# Patient Record
Sex: MALE | Race: WHITE | HISPANIC OR LATINO | Employment: FULL TIME | URBAN - METROPOLITAN AREA
[De-identification: names, ages, dates, MRNs, and addresses within clinical notes are randomized per-mention and may not be internally consistent; named-entity substitution may affect disease eponyms.]

---

## 2017-02-20 ENCOUNTER — APPOINTMENT (OUTPATIENT)
Dept: RADIOLOGY | Facility: MEDICAL CENTER | Age: 47
End: 2017-02-20
Attending: EMERGENCY MEDICINE
Payer: MEDICAID

## 2017-02-20 ENCOUNTER — HOSPITAL ENCOUNTER (EMERGENCY)
Facility: MEDICAL CENTER | Age: 47
End: 2017-02-20
Attending: EMERGENCY MEDICINE
Payer: MEDICAID

## 2017-02-20 VITALS
BODY MASS INDEX: 35.4 KG/M2 | TEMPERATURE: 97 F | HEIGHT: 67 IN | RESPIRATION RATE: 16 BRPM | WEIGHT: 225.53 LBS | OXYGEN SATURATION: 99 % | DIASTOLIC BLOOD PRESSURE: 56 MMHG | HEART RATE: 88 BPM | SYSTOLIC BLOOD PRESSURE: 109 MMHG

## 2017-02-20 DIAGNOSIS — R07.9 LEFT SIDED CHEST PAIN: ICD-10-CM

## 2017-02-20 LAB
ALBUMIN SERPL BCP-MCNC: 4.5 G/DL (ref 3.2–4.9)
ALBUMIN/GLOB SERPL: 1.5 G/DL
ALP SERPL-CCNC: 77 U/L (ref 30–99)
ALT SERPL-CCNC: 26 U/L (ref 2–50)
ANION GAP SERPL CALC-SCNC: 7 MMOL/L (ref 0–11.9)
AST SERPL-CCNC: 23 U/L (ref 12–45)
BASOPHILS # BLD AUTO: 0.1 % (ref 0–1.8)
BASOPHILS # BLD: 0.01 K/UL (ref 0–0.12)
BILIRUB SERPL-MCNC: 0.4 MG/DL (ref 0.1–1.5)
BUN SERPL-MCNC: 18 MG/DL (ref 8–22)
CALCIUM SERPL-MCNC: 9.3 MG/DL (ref 8.4–10.2)
CHLORIDE SERPL-SCNC: 101 MMOL/L (ref 96–112)
CO2 SERPL-SCNC: 28 MMOL/L (ref 20–33)
CREAT SERPL-MCNC: 0.95 MG/DL (ref 0.5–1.4)
EOSINOPHIL # BLD AUTO: 0.04 K/UL (ref 0–0.51)
EOSINOPHIL NFR BLD: 0.6 % (ref 0–6.9)
ERYTHROCYTE [DISTWIDTH] IN BLOOD BY AUTOMATED COUNT: 42.3 FL (ref 35.9–50)
GFR SERPL CREATININE-BSD FRML MDRD: >60 ML/MIN/1.73 M 2
GLOBULIN SER CALC-MCNC: 3 G/DL (ref 1.9–3.5)
GLUCOSE SERPL-MCNC: 97 MG/DL (ref 65–99)
HCT VFR BLD AUTO: 42 % (ref 42–52)
HGB BLD-MCNC: 14.7 G/DL (ref 14–18)
IMM GRANULOCYTES # BLD AUTO: 0.01 K/UL (ref 0–0.11)
IMM GRANULOCYTES NFR BLD AUTO: 0.1 % (ref 0–0.9)
LIPASE SERPL-CCNC: 28 U/L (ref 7–58)
LYMPHOCYTES # BLD AUTO: 3.69 K/UL (ref 1–4.8)
LYMPHOCYTES NFR BLD: 51 % (ref 22–41)
MCH RBC QN AUTO: 29.8 PG (ref 27–33)
MCHC RBC AUTO-ENTMCNC: 35 G/DL (ref 33.7–35.3)
MCV RBC AUTO: 85 FL (ref 81.4–97.8)
MONOCYTES # BLD AUTO: 0.5 K/UL (ref 0–0.85)
MONOCYTES NFR BLD AUTO: 6.9 % (ref 0–13.4)
NEUTROPHILS # BLD AUTO: 2.99 K/UL (ref 1.82–7.42)
NEUTROPHILS NFR BLD: 41.3 % (ref 44–72)
NRBC # BLD AUTO: 0 K/UL
NRBC BLD AUTO-RTO: 0 /100 WBC
PLATELET # BLD AUTO: 253 K/UL (ref 164–446)
PMV BLD AUTO: 9.4 FL (ref 9–12.9)
POTASSIUM SERPL-SCNC: 4 MMOL/L (ref 3.6–5.5)
PROT SERPL-MCNC: 7.5 G/DL (ref 6–8.2)
RBC # BLD AUTO: 4.94 M/UL (ref 4.7–6.1)
SODIUM SERPL-SCNC: 136 MMOL/L (ref 135–145)
TROPONIN I SERPL-MCNC: <0.02 NG/ML (ref 0–0.04)
WBC # BLD AUTO: 7.2 K/UL (ref 4.8–10.8)

## 2017-02-20 PROCEDURE — 93005 ELECTROCARDIOGRAM TRACING: CPT

## 2017-02-20 PROCEDURE — 84484 ASSAY OF TROPONIN QUANT: CPT

## 2017-02-20 PROCEDURE — 83690 ASSAY OF LIPASE: CPT

## 2017-02-20 PROCEDURE — 85025 COMPLETE CBC W/AUTO DIFF WBC: CPT

## 2017-02-20 PROCEDURE — 36415 COLL VENOUS BLD VENIPUNCTURE: CPT

## 2017-02-20 PROCEDURE — 99284 EMERGENCY DEPT VISIT MOD MDM: CPT

## 2017-02-20 PROCEDURE — 700111 HCHG RX REV CODE 636 W/ 250 OVERRIDE (IP): Performed by: EMERGENCY MEDICINE

## 2017-02-20 PROCEDURE — 96374 THER/PROPH/DIAG INJ IV PUSH: CPT

## 2017-02-20 PROCEDURE — 700105 HCHG RX REV CODE 258: Performed by: EMERGENCY MEDICINE

## 2017-02-20 PROCEDURE — 71010 DX-CHEST-PORTABLE (1 VIEW): CPT

## 2017-02-20 PROCEDURE — A9270 NON-COVERED ITEM OR SERVICE: HCPCS | Performed by: EMERGENCY MEDICINE

## 2017-02-20 PROCEDURE — 96361 HYDRATE IV INFUSION ADD-ON: CPT

## 2017-02-20 PROCEDURE — 80053 COMPREHEN METABOLIC PANEL: CPT

## 2017-02-20 PROCEDURE — 700102 HCHG RX REV CODE 250 W/ 637 OVERRIDE(OP): Performed by: EMERGENCY MEDICINE

## 2017-02-20 RX ORDER — KETOROLAC TROMETHAMINE 30 MG/ML
30 INJECTION, SOLUTION INTRAMUSCULAR; INTRAVENOUS ONCE
Status: COMPLETED | OUTPATIENT
Start: 2017-02-20 | End: 2017-02-20

## 2017-02-20 RX ORDER — ASPIRIN 81 MG/1
324 TABLET, CHEWABLE ORAL ONCE
Status: COMPLETED | OUTPATIENT
Start: 2017-02-20 | End: 2017-02-20

## 2017-02-20 RX ORDER — SODIUM CHLORIDE 9 MG/ML
1000 INJECTION, SOLUTION INTRAVENOUS ONCE
Status: COMPLETED | OUTPATIENT
Start: 2017-02-20 | End: 2017-02-20

## 2017-02-20 RX ADMIN — SODIUM CHLORIDE 1000 ML: 9 INJECTION, SOLUTION INTRAVENOUS at 18:22

## 2017-02-20 RX ADMIN — KETOROLAC TROMETHAMINE 30 MG: 30 INJECTION, SOLUTION INTRAMUSCULAR; INTRAVENOUS at 18:21

## 2017-02-20 RX ADMIN — ASPIRIN 81 MG CHEWABLE TABLET 324 MG: 81 TABLET CHEWABLE at 18:22

## 2017-02-20 ASSESSMENT — PAIN SCALES - GENERAL: PAINLEVEL_OUTOF10: 7

## 2017-02-20 NOTE — ED AVS SNAPSHOT
2/20/2017          Yaneli Samayoa  [address] [facility] [address]    Dear Yaneli:    [facility] wants to ensure your discharge home is safe and you or your loved ones have had all your questions answered regarding your care after you leave the hospital.    You may receive a telephone call within two days of your discharge.  This call is to make certain you understand your discharge instructions as well as ensure we provided you with the best care possible during your stay with us.     The call will only last approximately 3-5 minutes and will be done by a nurse.    Once again, we want to ensure your discharge home is safe and that you have a clear understanding of any next steps in your care.  If you have any questions or concerns, please do not hesitate to contact us, we are here for you.  Thank you for choosing [facility] for your healthcare needs.    Sincerely,    Graham Fritz    [facility]

## 2017-02-20 NOTE — ED AVS SNAPSHOT
Home Care Instructions                                                                                                                Yaneli Samayoa   MRN: [id number]    Department:  [facility], Emergency Dept   Date of Visit:  2/20/2017            [facility], Emergency Dept    [address]    [place] [address]    Phone:  [phone]      You were seen by     Carlos Alberto Bauman M.D.      Your Diagnosis Was     Left sided chest pain     R07.9       These are the medications you received during your hospitalization from 02/20/2017 1753 to 02/20/2017 1848     Date/Time Order Dose Route Action    02/20/2017 1822 NS infusion 1,000 mL 1,000 mL Intravenous New Bag    02/20/2017 1822 aspirin (ASA) chewable tab 324 mg 324 mg Oral Given    02/20/2017 1821 ketorolac (TORADOL) injection 30 mg 30 mg Intravenous Given      Follow-up Information     1. Follow up with [facility].    Contact information    [address] [address] [phone]        2. Follow up with [facility], Emergency Dept.    Specialty:  Emergency Medicine    Why:  If symptoms worsen, As needed    Contact information    [address] [facility] [address] [phone]      Medication Information     Review all of your home medications and newly ordered medications with your primary doctor and/or pharmacist as soon as possible. Follow medication instructions as directed by your doctor and/or pharmacist.     Please keep your complete medication list with you and share with your physician. Update the information when medications are discontinued, doses are changed, or new medications (including over-the-counter products) are added; and carry medication information at all times in the event of emergency situations.               Medication List      Notice     You have not been prescribed any medications.            Procedures and tests  performed during your visit     CBC WITH DIFFERENTIAL    COMP METABOLIC PANEL    DX-CHEST-PORTABLE (1 VIEW)    ESTIMATED GFR    LIPASE    TROPONIN        Discharge Instructions       Return immediately to the Emergency Department if you experience continuing or worsening discomfort in your chest, any difficulty breathing, back pain, abdominal pain, or any other new or worsening symptoms.      Dolor en el pecho, Inespecífico  (Chest Pain, Nonspecific)  Con frecuencia es difícil farhad un diagnóstico específico de la causa del dolor de pecho. Siempre hay lisa posibilidad de que latif dolor puede estar relacionado con algo grave, river un ataque al corazón o un coágulo de nicole en los pulmones. Deberá hacer controles con latif médico para lisa evaluación adicional. Puede ser necesario realizar otros análisis de laboratorio u otros estudios tales river radiografías, electrocardiograma, prueba de esfuerzo, o diagnóstico por imágenes para el corazón para determinar la causa de latif dolor.   La mayor parte del dolor en el pecho inespecífico mejorará en 2 ó 3 días de reposo y analgésicos suaves. Bernabe los próximos días evite los ejercicios físicos o las actividades que le causan dolor. No fume. Evite consumir alcohol. Comuníquese con latif médico para realizar controles según las indicaciones.   SOLICITE ATENCIÓN MÉDICA DE INMEDIATO SI:  · El dolor en el pecho aumenta o se irradia hacia el brazo, el keisha, la mandíbula, la espalda o el abdomen.   · Le falta el aire, aumenta la tos o comienza a escupir nicole al toser.   · Siente un dolor intenso en la espalda, el abdomen, tiene náuseas o vómitos intensos.   · Desarrolla lisa debilidad extrema, se desmaya, tiene fiebre o escalofríos.   Document Released: 12/18/2006 Document Revised: 03/11/2013  ExitCare® Patient Information ©2013 [employer].          Patient Information     Patient Information    Following emergency treatment: all patient requiring follow-up care must return either to a  private physician or a clinic if your condition worsens before you are able to obtain further medical attention, please return to the emergency room.     Billing Information    At [facility], we work to make the billing process streamlined for our patients.  Our Representatives are here to answer any questions you may have regarding your hospital bill.  If you have insurance coverage and have supplied your insurance information to us, we will submit a claim to your insurer on your behalf.  Should you have any questions regarding your bill, we can be reached online or by phone as follows:  Online: You are able pay your bills online or live chat with our representatives about any billing questions you may have. We are here to help Monday - Friday from 8:00am to 7:30pm and 9:00am - 12:00pm on Saturdays.  Please visit [email or web address]  for more information.   Phone:  [phone] or [phone]    Please note that your emergency physician, surgeon, pathologist, radiologist, anesthesiologist, and other specialists are not employed by [facility] and will therefore bill separately for their services.  Please contact them directly for any questions concerning their bills at the numbers below:     Emergency Physician Services:  [phone]  [facility]:  [phone]  [facility]:  [phone]  [facility]:  [phone]    1. Your final bill may vary from the amount quoted upon discharge if all procedures are not complete at that time, or if your doctor has additional procedures of which we are not aware. You will receive an additional bill if you return to the Emergency Department at [facility] for suture removal regardless of the facility of which the sutures were placed.     2. Please arrange for settlement of this account at the emergency registration.    3. All self-pay accounts are due in full at the time of treatment.  If you  are unable to meet this obligation then payment is expected within 4-5 days.     4. If you have had radiology studies (CT, X-ray, Ultrasound, MRI), you have received a preliminary result during your emergency department visit. Please contact the radiology department [phone] to receive a copy of your final result. Please discuss the Final result with your primary physician or with the follow up physician provided.     Crisis Hotline:  [place] Crisis Hotline:  6-146-LJAKOVR or 1-757.256.2235  [place] Crisis Hotline:    1-542.141.7568 or 230-803-3210         ED Discharge Follow Up Questions    1. In order to provide you with very good care, we would like to follow up with a phone call in the next few days.  May we have your permission to contact you?     YES /  NO    2. What is the best phone number to call you? (       )_____-__________    3. What is the best time to call you?      Morning  /  Afternoon  /  Evening                   Patient Signature:  ____________________________________________________________    Date:  ____________________________________________________________

## 2017-02-20 NOTE — ED AVS SNAPSHOT
sourceasy Access Code: [email or web address]  Expires: 3/22/2017  6:47 PM    Your email address is not on file at [facility].  Email Addresses are required for you to sign up for sourceasy, please contact [phone] to verify your personal information and to provide your email address prior to attempting to register for sourceasy.    Yaneli Samayoa  [address] [place] [address]    sourceasy  A secure, online tool to manage your health information     [facility]’s sourceasy® is a secure, online tool that connects you to your personalized health information from the privacy of your home -- day or night - making it very easy for you to manage your healthcare. Once the activation process is completed, you can even access your medical information using the sourceasy jules, which is available for free in the Apple Jules store or Google Play store.     To learn more about sourceasy, visit [email or web address]    There are two levels of access available (as shown below):   My Chart Features  [facility] Primary Care Doctor [facility]  Specialists [facility]  Urgent  Care Non-[facility] Primary Care Doctor   Email your healthcare team securely and privately 24/7 X X X    Manage appointments: schedule your next appointment; view details of past/upcoming appointments X      Request prescription refills. X      View recent personal medical records, including lab and immunizations X X X X   View health record, including health history, allergies, medications X X X X   Read reports about your outpatient visits, procedures, consult and ER notes X X X X   See your discharge summary, which is a recap of your hospital and/or ER visit that includes your diagnosis, lab results, and care plan X X  X     How to register for Typekitt:  Once your e-mail address has been verified, follow the following steps to sign up for Typekitt.     1. Go to  [email or web address]  2. Click on the Sign Up Now box, which takes you to the New Member Sign Up  page. You will need to provide the following information:  a. Enter your [email or web address] Access Code exactly as it appears at the top of this page. (You will not need to use this code after you’ve completed the sign-up process. If you do not sign up before the expiration date, you must request a new code.)   b. Enter your date of birth.   c. Enter your home email address.   d. Click Submit, and follow the next screen’s instructions.  3. Create a [email or web address] ID. This will be your [email or web address] login ID and cannot be changed, so think of one that is secure and easy to remember.  4. Create a ddmap.com password. You can change your password at any time.  5. Enter your Password Reset Question and Answer. This can be used at a later time if you forget your password.   6. Enter your e-mail address. This allows you to receive e-mail notifications when new information is available in [email or web address].  7. Click Sign Up. You can now view your health information.    For assistance activating your [email or web address] account, call [phone]

## 2017-02-21 NOTE — ED PROVIDER NOTES
"ED Provider Note    CHIEF COMPLAINT  Chief Complaint   Patient presents with   • Chest Pain     Left sided chest pain. Pt states \"it may be air stuck in there, its painful to take deep breaths\"       INOCENCIA Samayoa is a 46 y.o. male who presents for evaluation of a dull left-sided chest pain that is been present constantly for the past 8 hours without resolution. Worsened with deep inspiration, coughing as well as movement of his arm. He also reports is worse if he touches his chest. He denies radiation of the pain, no worsening with exertion, no associated shortness of breath, diaphoresis or nausea. Patient has no known coronary disease, he does not have a history of hypertension or hypercholesterolemia or diabetes and he has no significant family history of coronary disease that he knows of. He is not a smoker. No back pain, no abdominal pain, no weakness, numbness or tingling he has no other complaints at this time. No unilateral leg pain or swelling, no recent travel or immobilization, no hemoptysis, no known cancer    REVIEW OF SYSTEMS  Negative for fever, rash, dyspnea, abdominal pain, nausea, vomiting, diarrhea, headache, focal weakness, focal numbness, focal tingling, back pain. All other systems are negative.     PAST MEDICAL HISTORY  No past medical history on file.    FAMILY HISTORY  No family history on file.    SOCIAL HISTORY  Social History   Substance Use Topics   • Smoking status: Not on file   • Smokeless tobacco: Not on file   • Alcohol Use: Not on file       SURGICAL HISTORY  No past surgical history on file.    CURRENT MEDICATIONS  I personally reviewed the medication list in the charting documentation.     ALLERGIES  Allergies not on file    MEDICAL RECORD  I have reviewed patient's medical record and pertinent results are listed above.      PHYSICAL EXAM  VITAL SIGNS: /56 mmHg  Pulse 88  Temp(Src) 36.1 °C (97 °F)  Resp 16  Ht 1.702 m (5' 7.01\")  Wt 102.3 kg (225 lb 8.5 " oz)  BMI 35.31 kg/m2  SpO2 99%   Constitutional: Well appearing patient in no acute distress.  Not toxic, nor ill in appearance.  HENT: Mucus membranes moist.    Eyes: No scleral icterus. Normal conjunctiva   Neck: Supple, comfortable, nonpainful range of motion.   Cardiovascular: Regular heart rate and rhythm.   Thorax & Lungs: Chest is nontender.  Lungs are clear to auscultation with good air movement bilaterally.  No wheeze, rhonchi, nor rales.   Abdomen: Soft, with no tenderness, rebound nor guarding.  No mass or pulsatile mass appreciated.  Skin: Warm, dry. No rash appreciated  Extremities/Musculoskeletal: No sign of trauma. No asymmetric calf tenderness, erythema or edema. Normal range of motion   Neurologic: Alert & oriented. No focal deficits observed.   Psychiatric: Normal affect appropriate for the clinical situation.    DIAGNOSTIC STUDIES / PROCEDURES    EKG  12 Lead EKG interpreted by me to show:    Rate 62  Rhythm: Normal sinus rhythm  Axis: Normal  KS and QRS Intervals: Normal  T waves: No acute changes  ST segments: No acute changes  Ectopy: None.    My impression of this EKG: Does not indicate ischemia or arrythmia at this time.    LABS  Results for orders placed or performed during the hospital encounter of 02/20/17   CBC WITH DIFFERENTIAL   Result Value Ref Range    WBC 7.2 4.8 - 10.8 K/uL    RBC 4.94 4.70 - 6.10 M/uL    Hemoglobin 14.7 14.0 - 18.0 g/dL    Hematocrit 42.0 42.0 - 52.0 %    MCV 85.0 81.4 - 97.8 fL    MCH 29.8 27.0 - 33.0 pg    MCHC 35.0 33.7 - 35.3 g/dL    RDW 42.3 35.9 - 50.0 fL    Platelet Count 253 164 - 446 K/uL    MPV 9.4 9.0 - 12.9 fL    Neutrophils-Polys 41.30 (L) 44.00 - 72.00 %    Lymphocytes 51.00 (H) 22.00 - 41.00 %    Monocytes 6.90 0.00 - 13.40 %    Eosinophils 0.60 0.00 - 6.90 %    Basophils 0.10 0.00 - 1.80 %    Immature Granulocytes 0.10 0.00 - 0.90 %    Nucleated RBC 0.00 /100 WBC    Neutrophils (Absolute) 2.99 1.82 - 7.42 K/uL    Lymphs (Absolute) 3.69 1.00 - 4.80  K/uL    Monos (Absolute) 0.50 0.00 - 0.85 K/uL    Eos (Absolute) 0.04 0.00 - 0.51 K/uL    Baso (Absolute) 0.01 0.00 - 0.12 K/uL    Immature Granulocytes (abs) 0.01 0.00 - 0.11 K/uL    NRBC (Absolute) 0.00 K/uL   COMP METABOLIC PANEL   Result Value Ref Range    Sodium 136 135 - 145 mmol/L    Potassium 4.0 3.6 - 5.5 mmol/L    Chloride 101 96 - 112 mmol/L    Co2 28 20 - 33 mmol/L    Anion Gap 7.0 0.0 - 11.9    Glucose 97 65 - 99 mg/dL    Bun 18 8 - 22 mg/dL    Creatinine 0.95 0.50 - 1.40 mg/dL    Calcium 9.3 8.4 - 10.2 mg/dL    AST(SGOT) 23 12 - 45 U/L    ALT(SGPT) 26 2 - 50 U/L    Alkaline Phosphatase 77 30 - 99 U/L    Total Bilirubin 0.4 0.1 - 1.5 mg/dL    Albumin 4.5 3.2 - 4.9 g/dL    Total Protein 7.5 6.0 - 8.2 g/dL    Globulin 3.0 1.9 - 3.5 g/dL    A-G Ratio 1.5 g/dL   TROPONIN   Result Value Ref Range    Troponin I <0.02 0.00 - 0.04 ng/mL   LIPASE   Result Value Ref Range    Lipase 28 7 - 58 U/L   ESTIMATED GFR   Result Value Ref Range    GFR If African American >60 >60 mL/min/1.73 m 2    GFR If Non African American >60 >60 mL/min/1.73 m 2         RADIOLOGY  DX-CHEST-PORTABLE (1 VIEW)   Final Result      1.  There is no acute cardiopulmonary process.            COURSE & MEDICAL DECISION MAKING  I have reviewed any medical record information, laboratory studies and radiographic results as noted above.  Differential diagnoses includes: ACS, anemia, dehydration, pneumothorax, pneumonia, chest wall pain, doubt pulmonary embolism    Encounter Summary: This is a 46 y.o. male with left-sided chest pain, so he present for 8 hours, nonexertional, no shortness of breath, he no identifiable risk factors for coronary disease. His vital signs are within normal limits, his EKG is normal. The patient's exam is normal.The patient was ruled out for PE by PERC criteria: AGE < 50; No estrogens, BCPs, recent surgery (4 weeks); No hx of: DVT/PE or hemoptysis; No unilateral leg swelling; Pulse Ox > 94% and HR <100. Will obtain a  troponin which will effectively rule out myocardial ischemia given the duration of constant symptoms, will check a chest x-ray and some basic blood work as well. Will medicate with Toradol and reevaluate ----- blood work is unremarkable, x-ray is unremarkable. Patient is feeling better, he is now pain free after Toradol. At this point, given the duration of constant symptoms I think we have reasonably ruled out myocardial ischemia and infarction. I think it is a musculoskeletal etiology. Discussed symptomatically treatment with anti-inflammatory. Discussed strict return instructions to both he and his significant other at the bedside expressed understanding.        DISPOSITION: Discharged home in stable condition      FINAL IMPRESSION  1. Left sided chest pain           This dictation was created using voice recognition software. The accuracy of the dictation is limited to the abilities of the software. I expect there may be some errors of grammar and possibly content. The nursing notes were reviewed and certain aspects of this information were incorporated into this note.    Electronically signed by: Carlos Alberto Bauman, 2/20/2017 6:12 PM

## 2017-02-21 NOTE — ED NOTES
"Chief Complaint   Patient presents with   • Chest Pain     Left sided chest pain. Pt states \"it may be air stuck in there, its painful to take deep breaths\"       /56 mmHg  Pulse 88  Temp(Src) 36.1 °C (97 °F)  Resp 16  Ht 1.702 m (5' 7.01\")  Wt 102.3 kg (225 lb 8.5 oz)  BMI 35.31 kg/m2  SpO2 99%    "

## 2017-02-21 NOTE — ED NOTES
1830 report received from primary rn Jac, care assumed at this time  1840 All results back, chart up for MD for re evaluation. poc update given to pt. No further questions at this time. Further orders and dispo pending  1845 pt re evaluated by md  1856 D/c pt home, with family . Pt aware of f/u instructions , aware to return for any changes or concerns. No further questions upon d/c home from ed

## 2017-02-21 NOTE — DISCHARGE INSTRUCTIONS
Return immediately to the Emergency Department if you experience continuing or worsening discomfort in your chest, any difficulty breathing, back pain, abdominal pain, or any other new or worsening symptoms.      Dolor en el pecho, Inespecífico  (Chest Pain, Nonspecific)  Con frecuencia es difícil farhad un diagnóstico específico de la causa del dolor de pecho. Siempre hay lisa posibilidad de que latif dolor puede estar relacionado con algo grave, river un ataque al corazón o un coágulo de nicole en los pulmones. Deberá hacer controles con latif médico para lisa evaluación adicional. Puede ser necesario realizar otros análisis de laboratorio u otros estudios tales river radiografías, electrocardiograma, prueba de esfuerzo, o diagnóstico por imágenes para el corazón para determinar la causa de latif dolor.   La mayor parte del dolor en el pecho inespecífico mejorará en 2 ó 3 días de reposo y analgésicos suaves. Bernabe los próximos días evite los ejercicios físicos o las actividades que le causan dolor. No fume. Evite consumir alcohol. Comuníquese con latif médico para realizar controles según las indicaciones.   SOLICITE ATENCIÓN MÉDICA DE INMEDIATO SI:  · El dolor en el pecho aumenta o se irradia hacia el brazo, el keisha, la mandíbula, la espalda o el abdomen.   · Le falta el aire, aumenta la tos o comienza a escupir nicole al toser.   · Siente un dolor intenso en la espalda, el abdomen, tiene náuseas o vómitos intensos.   · Desarrolla lisa debilidad extrema, se desmaya, tiene fiebre o escalofríos.   Document Released: 12/18/2006 Document Revised: 03/11/2013  [employer]® Patient Information ©2013 [employer].

## 2022-03-09 ENCOUNTER — APPOINTMENT (OUTPATIENT)
Dept: RADIOLOGY | Facility: MEDICAL CENTER | Age: 52
DRG: 871 | End: 2022-03-09
Attending: EMERGENCY MEDICINE

## 2022-03-09 ENCOUNTER — HOSPITAL ENCOUNTER (INPATIENT)
Facility: MEDICAL CENTER | Age: 52
LOS: 2 days | DRG: 871 | End: 2022-03-11
Attending: EMERGENCY MEDICINE | Admitting: INTERNAL MEDICINE

## 2022-03-09 DIAGNOSIS — J96.01 SEPSIS WITH ACUTE HYPOXIC RESPIRATORY FAILURE WITHOUT SEPTIC SHOCK, DUE TO UNSPECIFIED ORGANISM (HCC): ICD-10-CM

## 2022-03-09 DIAGNOSIS — A41.9 SEPSIS WITH ACUTE HYPOXIC RESPIRATORY FAILURE WITHOUT SEPTIC SHOCK, DUE TO UNSPECIFIED ORGANISM (HCC): ICD-10-CM

## 2022-03-09 DIAGNOSIS — J18.9 PNEUMONIA OF BOTH LUNGS DUE TO INFECTIOUS ORGANISM, UNSPECIFIED PART OF LUNG: ICD-10-CM

## 2022-03-09 DIAGNOSIS — J18.9 COMMUNITY ACQUIRED PNEUMONIA OF LEFT LUNG, UNSPECIFIED PART OF LUNG: ICD-10-CM

## 2022-03-09 DIAGNOSIS — J18.9 COMMUNITY ACQUIRED PNEUMONIA OF LEFT LOWER LOBE OF LUNG: ICD-10-CM

## 2022-03-09 DIAGNOSIS — R65.20 SEPSIS WITH ACUTE HYPOXIC RESPIRATORY FAILURE WITHOUT SEPTIC SHOCK, DUE TO UNSPECIFIED ORGANISM (HCC): ICD-10-CM

## 2022-03-09 PROBLEM — R74.01 TRANSAMINITIS: Status: ACTIVE | Noted: 2022-03-09

## 2022-03-09 LAB
ALBUMIN SERPL BCP-MCNC: 3.4 G/DL (ref 3.2–4.9)
ALBUMIN/GLOB SERPL: 0.8 G/DL
ALP SERPL-CCNC: 136 U/L (ref 30–99)
ALT SERPL-CCNC: 231 U/L (ref 2–50)
ANION GAP SERPL CALC-SCNC: 15 MMOL/L (ref 7–16)
ANISOCYTOSIS BLD QL SMEAR: ABNORMAL
AST SERPL-CCNC: 341 U/L (ref 12–45)
BASOPHILS # BLD AUTO: 0 % (ref 0–1.8)
BASOPHILS # BLD: 0 K/UL (ref 0–0.12)
BILIRUB SERPL-MCNC: 1 MG/DL (ref 0.1–1.5)
BUN SERPL-MCNC: 25 MG/DL (ref 8–22)
BURR CELLS BLD QL SMEAR: NORMAL
CALCIUM SERPL-MCNC: 9.5 MG/DL (ref 8.5–10.5)
CHLORIDE SERPL-SCNC: 96 MMOL/L (ref 96–112)
CO2 SERPL-SCNC: 22 MMOL/L (ref 20–33)
CREAT SERPL-MCNC: 1.02 MG/DL (ref 0.5–1.4)
DACRYOCYTES BLD QL SMEAR: NORMAL
EKG IMPRESSION: NORMAL
EOSINOPHIL # BLD AUTO: 0 K/UL (ref 0–0.51)
EOSINOPHIL NFR BLD: 0 % (ref 0–6.9)
ERYTHROCYTE [DISTWIDTH] IN BLOOD BY AUTOMATED COUNT: 49.1 FL (ref 35.9–50)
FLUAV RNA SPEC QL NAA+PROBE: NEGATIVE
FLUBV RNA SPEC QL NAA+PROBE: NEGATIVE
GLOBULIN SER CALC-MCNC: 4.2 G/DL (ref 1.9–3.5)
GLUCOSE SERPL-MCNC: 128 MG/DL (ref 65–99)
HCT VFR BLD AUTO: 38.3 % (ref 42–52)
HGB BLD-MCNC: 13 G/DL (ref 14–18)
LACTATE BLD-SCNC: 1.6 MMOL/L (ref 0.5–2)
LACTATE BLD-SCNC: 1.6 MMOL/L (ref 0.5–2)
LYMPHOCYTES # BLD AUTO: 0.69 K/UL (ref 1–4.8)
LYMPHOCYTES NFR BLD: 5.2 % (ref 22–41)
MACROCYTES BLD QL SMEAR: ABNORMAL
MANUAL DIFF BLD: NORMAL
MCH RBC QN AUTO: 29.3 PG (ref 27–33)
MCHC RBC AUTO-ENTMCNC: 33.9 G/DL (ref 33.7–35.3)
MCV RBC AUTO: 86.5 FL (ref 81.4–97.8)
MICROCYTES BLD QL SMEAR: ABNORMAL
MONOCYTES # BLD AUTO: 0.45 K/UL (ref 0–0.85)
MONOCYTES NFR BLD AUTO: 3.4 % (ref 0–13.4)
MORPHOLOGY BLD-IMP: NORMAL
MYELOCYTES NFR BLD MANUAL: 1.7 %
NEUTROPHILS # BLD AUTO: 11.84 K/UL (ref 1.82–7.42)
NEUTROPHILS NFR BLD: 89.7 % (ref 44–72)
NRBC # BLD AUTO: 0 K/UL
NRBC BLD-RTO: 0 /100 WBC
NT-PROBNP SERPL IA-MCNC: 116 PG/ML (ref 0–125)
PLATELET # BLD AUTO: 266 K/UL (ref 164–446)
PLATELET BLD QL SMEAR: NORMAL
PMV BLD AUTO: 10.6 FL (ref 9–12.9)
POIKILOCYTOSIS BLD QL SMEAR: NORMAL
POTASSIUM SERPL-SCNC: 3.7 MMOL/L (ref 3.6–5.5)
PROCALCITONIN SERPL-MCNC: 2.8 NG/ML
PROT SERPL-MCNC: 7.6 G/DL (ref 6–8.2)
RBC # BLD AUTO: 4.43 M/UL (ref 4.7–6.1)
RBC BLD AUTO: PRESENT
RSV RNA SPEC QL NAA+PROBE: NEGATIVE
SARS-COV-2 RNA RESP QL NAA+PROBE: NOTDETECTED
SODIUM SERPL-SCNC: 133 MMOL/L (ref 135–145)
SPECIMEN SOURCE: NORMAL
TROPONIN T SERPL-MCNC: 11 NG/L (ref 6–19)
WBC # BLD AUTO: 13.2 K/UL (ref 4.8–10.8)

## 2022-03-09 PROCEDURE — 84484 ASSAY OF TROPONIN QUANT: CPT

## 2022-03-09 PROCEDURE — 80053 COMPREHEN METABOLIC PANEL: CPT

## 2022-03-09 PROCEDURE — 87340 HEPATITIS B SURFACE AG IA: CPT

## 2022-03-09 PROCEDURE — 306637 HCHG MISC ORTHO ITEM RC 0274

## 2022-03-09 PROCEDURE — 71275 CT ANGIOGRAPHY CHEST: CPT

## 2022-03-09 PROCEDURE — 700105 HCHG RX REV CODE 258: Performed by: EMERGENCY MEDICINE

## 2022-03-09 PROCEDURE — 0241U HCHG SARS-COV-2 COVID-19 NFCT DS RESP RNA 4 TRGT MIC: CPT

## 2022-03-09 PROCEDURE — 99285 EMERGENCY DEPT VISIT HI MDM: CPT

## 2022-03-09 PROCEDURE — 96367 TX/PROPH/DG ADDL SEQ IV INF: CPT

## 2022-03-09 PROCEDURE — 36415 COLL VENOUS BLD VENIPUNCTURE: CPT

## 2022-03-09 PROCEDURE — 83735 ASSAY OF MAGNESIUM: CPT

## 2022-03-09 PROCEDURE — 700111 HCHG RX REV CODE 636 W/ 250 OVERRIDE (IP): Performed by: EMERGENCY MEDICINE

## 2022-03-09 PROCEDURE — 83605 ASSAY OF LACTIC ACID: CPT

## 2022-03-09 PROCEDURE — 700117 HCHG RX CONTRAST REV CODE 255: Performed by: EMERGENCY MEDICINE

## 2022-03-09 PROCEDURE — 99223 1ST HOSP IP/OBS HIGH 75: CPT | Performed by: STUDENT IN AN ORGANIZED HEALTH CARE EDUCATION/TRAINING PROGRAM

## 2022-03-09 PROCEDURE — 700105 HCHG RX REV CODE 258: Performed by: STUDENT IN AN ORGANIZED HEALTH CARE EDUCATION/TRAINING PROGRAM

## 2022-03-09 PROCEDURE — 85007 BL SMEAR W/DIFF WBC COUNT: CPT

## 2022-03-09 PROCEDURE — 96365 THER/PROPH/DIAG IV INF INIT: CPT

## 2022-03-09 PROCEDURE — 87040 BLOOD CULTURE FOR BACTERIA: CPT

## 2022-03-09 PROCEDURE — 83880 ASSAY OF NATRIURETIC PEPTIDE: CPT

## 2022-03-09 PROCEDURE — 96372 THER/PROPH/DIAG INJ SC/IM: CPT

## 2022-03-09 PROCEDURE — 85027 COMPLETE CBC AUTOMATED: CPT

## 2022-03-09 PROCEDURE — 86803 HEPATITIS C AB TEST: CPT

## 2022-03-09 PROCEDURE — 84145 PROCALCITONIN (PCT): CPT

## 2022-03-09 PROCEDURE — 71045 X-RAY EXAM CHEST 1 VIEW: CPT

## 2022-03-09 PROCEDURE — 93005 ELECTROCARDIOGRAM TRACING: CPT | Performed by: EMERGENCY MEDICINE

## 2022-03-09 PROCEDURE — 96375 TX/PRO/DX INJ NEW DRUG ADDON: CPT

## 2022-03-09 PROCEDURE — 85610 PROTHROMBIN TIME: CPT

## 2022-03-09 PROCEDURE — C9803 HOPD COVID-19 SPEC COLLECT: HCPCS | Performed by: EMERGENCY MEDICINE

## 2022-03-09 RX ORDER — ACETAMINOPHEN 325 MG/1
650 TABLET ORAL EVERY 6 HOURS PRN
Status: DISCONTINUED | OUTPATIENT
Start: 2022-03-09 | End: 2022-03-10

## 2022-03-09 RX ORDER — PROCHLORPERAZINE EDISYLATE 5 MG/ML
5-10 INJECTION INTRAMUSCULAR; INTRAVENOUS EVERY 4 HOURS PRN
Status: DISCONTINUED | OUTPATIENT
Start: 2022-03-09 | End: 2022-03-11 | Stop reason: HOSPADM

## 2022-03-09 RX ORDER — POLYETHYLENE GLYCOL 3350 17 G/17G
1 POWDER, FOR SOLUTION ORAL
Status: DISCONTINUED | OUTPATIENT
Start: 2022-03-09 | End: 2022-03-11 | Stop reason: HOSPADM

## 2022-03-09 RX ORDER — AZITHROMYCIN 250 MG/1
500 TABLET, FILM COATED ORAL DAILY
Status: COMPLETED | OUTPATIENT
Start: 2022-03-10 | End: 2022-03-11

## 2022-03-09 RX ORDER — PROMETHAZINE HYDROCHLORIDE 25 MG/1
12.5-25 TABLET ORAL EVERY 4 HOURS PRN
Status: DISCONTINUED | OUTPATIENT
Start: 2022-03-09 | End: 2022-03-11 | Stop reason: HOSPADM

## 2022-03-09 RX ORDER — KETOROLAC TROMETHAMINE 30 MG/ML
30 INJECTION, SOLUTION INTRAMUSCULAR; INTRAVENOUS ONCE
Status: COMPLETED | OUTPATIENT
Start: 2022-03-09 | End: 2022-03-09

## 2022-03-09 RX ORDER — AZITHROMYCIN 500 MG/1
500 INJECTION, POWDER, LYOPHILIZED, FOR SOLUTION INTRAVENOUS ONCE
Status: COMPLETED | OUTPATIENT
Start: 2022-03-09 | End: 2022-03-09

## 2022-03-09 RX ORDER — SODIUM CHLORIDE, SODIUM LACTATE, POTASSIUM CHLORIDE, CALCIUM CHLORIDE 600; 310; 30; 20 MG/100ML; MG/100ML; MG/100ML; MG/100ML
INJECTION, SOLUTION INTRAVENOUS CONTINUOUS
Status: DISCONTINUED | OUTPATIENT
Start: 2022-03-09 | End: 2022-03-11 | Stop reason: HOSPADM

## 2022-03-09 RX ORDER — AMOXICILLIN 250 MG
2 CAPSULE ORAL 2 TIMES DAILY
Status: DISCONTINUED | OUTPATIENT
Start: 2022-03-09 | End: 2022-03-11 | Stop reason: HOSPADM

## 2022-03-09 RX ORDER — LABETALOL HYDROCHLORIDE 5 MG/ML
10 INJECTION, SOLUTION INTRAVENOUS EVERY 4 HOURS PRN
Status: DISCONTINUED | OUTPATIENT
Start: 2022-03-09 | End: 2022-03-11 | Stop reason: HOSPADM

## 2022-03-09 RX ORDER — PROMETHAZINE HYDROCHLORIDE 25 MG/1
12.5-25 SUPPOSITORY RECTAL EVERY 4 HOURS PRN
Status: DISCONTINUED | OUTPATIENT
Start: 2022-03-09 | End: 2022-03-11 | Stop reason: HOSPADM

## 2022-03-09 RX ORDER — ONDANSETRON 2 MG/ML
4 INJECTION INTRAMUSCULAR; INTRAVENOUS EVERY 4 HOURS PRN
Status: DISCONTINUED | OUTPATIENT
Start: 2022-03-09 | End: 2022-03-11 | Stop reason: HOSPADM

## 2022-03-09 RX ORDER — IBUPROFEN 200 MG
400 TABLET ORAL 2 TIMES DAILY PRN
Status: SHIPPED | COMMUNITY
End: 2022-03-11

## 2022-03-09 RX ORDER — ONDANSETRON 4 MG/1
4 TABLET, ORALLY DISINTEGRATING ORAL EVERY 4 HOURS PRN
Status: DISCONTINUED | OUTPATIENT
Start: 2022-03-09 | End: 2022-03-11 | Stop reason: HOSPADM

## 2022-03-09 RX ORDER — BISACODYL 10 MG
10 SUPPOSITORY, RECTAL RECTAL
Status: DISCONTINUED | OUTPATIENT
Start: 2022-03-09 | End: 2022-03-11 | Stop reason: HOSPADM

## 2022-03-09 RX ORDER — AZITHROMYCIN 250 MG/1
500 TABLET, FILM COATED ORAL DAILY
Status: DISCONTINUED | OUTPATIENT
Start: 2022-03-10 | End: 2022-03-09

## 2022-03-09 RX ADMIN — SODIUM CHLORIDE, POTASSIUM CHLORIDE, SODIUM LACTATE AND CALCIUM CHLORIDE: 600; 310; 30; 20 INJECTION, SOLUTION INTRAVENOUS at 23:05

## 2022-03-09 RX ADMIN — KETOROLAC TROMETHAMINE 30 MG: 30 INJECTION, SOLUTION INTRAMUSCULAR; INTRAVENOUS at 19:21

## 2022-03-09 RX ADMIN — SODIUM CHLORIDE 3 G: 900 INJECTION INTRAVENOUS at 19:21

## 2022-03-09 RX ADMIN — IOHEXOL 50 ML: 350 INJECTION, SOLUTION INTRAVENOUS at 19:19

## 2022-03-09 RX ADMIN — AZITHROMYCIN MONOHYDRATE 500 MG: 500 INJECTION, POWDER, LYOPHILIZED, FOR SOLUTION INTRAVENOUS at 20:09

## 2022-03-09 ASSESSMENT — PAIN DESCRIPTION - PAIN TYPE: TYPE: ACUTE PAIN

## 2022-03-09 ASSESSMENT — ENCOUNTER SYMPTOMS
SHORTNESS OF BREATH: 1
PALPITATIONS: 1

## 2022-03-10 ENCOUNTER — APPOINTMENT (OUTPATIENT)
Dept: RADIOLOGY | Facility: MEDICAL CENTER | Age: 52
DRG: 871 | End: 2022-03-10
Attending: INTERNAL MEDICINE

## 2022-03-10 ENCOUNTER — APPOINTMENT (OUTPATIENT)
Dept: RADIOLOGY | Facility: REHABILITATION | Age: 52
DRG: 871 | End: 2022-03-10
Attending: INTERNAL MEDICINE

## 2022-03-10 PROBLEM — J18.9 SEPSIS DUE TO PNEUMONIA (HCC): Status: ACTIVE | Noted: 2022-03-09

## 2022-03-10 LAB
ALBUMIN SERPL BCP-MCNC: 2.7 G/DL (ref 3.2–4.9)
ALBUMIN/GLOB SERPL: 0.7 G/DL
ALP SERPL-CCNC: 121 U/L (ref 30–99)
ALT SERPL-CCNC: 198 U/L (ref 2–50)
ANION GAP SERPL CALC-SCNC: 13 MMOL/L (ref 7–16)
ANISOCYTOSIS BLD QL SMEAR: ABNORMAL
APPEARANCE UR: ABNORMAL
AST SERPL-CCNC: 240 U/L (ref 12–45)
BACTERIA #/AREA URNS HPF: NEGATIVE /HPF
BASOPHILS # BLD AUTO: 0 % (ref 0–1.8)
BASOPHILS # BLD: 0 K/UL (ref 0–0.12)
BILIRUB SERPL-MCNC: 1.7 MG/DL (ref 0.1–1.5)
BILIRUB UR QL STRIP.AUTO: ABNORMAL
BUN SERPL-MCNC: 26 MG/DL (ref 8–22)
CALCIUM SERPL-MCNC: 8.6 MG/DL (ref 8.5–10.5)
CHLORIDE SERPL-SCNC: 97 MMOL/L (ref 96–112)
CHOLEST SERPL-MCNC: 127 MG/DL (ref 100–199)
CO2 SERPL-SCNC: 22 MMOL/L (ref 20–33)
COLOR UR: ABNORMAL
CREAT SERPL-MCNC: 0.89 MG/DL (ref 0.5–1.4)
EOSINOPHIL # BLD AUTO: 0.14 K/UL (ref 0–0.51)
EOSINOPHIL NFR BLD: 0.9 % (ref 0–6.9)
EPI CELLS #/AREA URNS HPF: ABNORMAL /HPF
ERYTHROCYTE [DISTWIDTH] IN BLOOD BY AUTOMATED COUNT: 47 FL (ref 35.9–50)
EST. AVERAGE GLUCOSE BLD GHB EST-MCNC: 126 MG/DL
GGT SERPL-CCNC: 64 U/L (ref 7–51)
GLOBULIN SER CALC-MCNC: 3.9 G/DL (ref 1.9–3.5)
GLUCOSE SERPL-MCNC: 113 MG/DL (ref 65–99)
GLUCOSE UR STRIP.AUTO-MCNC: NEGATIVE MG/DL
GRAN CASTS #/AREA URNS LPF: ABNORMAL /LPF
HBA1C MFR BLD: 6 % (ref 4–5.6)
HBV SURFACE AG SER QL: NORMAL
HCT VFR BLD AUTO: 33.8 % (ref 42–52)
HCV AB SER QL: NORMAL
HDLC SERPL-MCNC: 22 MG/DL
HGB BLD-MCNC: 11.7 G/DL (ref 14–18)
INR PPP: 1.24 (ref 0.87–1.13)
KETONES UR STRIP.AUTO-MCNC: NEGATIVE MG/DL
LACTATE BLD-SCNC: 1.4 MMOL/L (ref 0.5–2)
LDH SERPL L TO P-CCNC: 466 U/L (ref 107–266)
LDLC SERPL CALC-MCNC: 72 MG/DL
LEUKOCYTE ESTERASE UR QL STRIP.AUTO: ABNORMAL
LYMPHOCYTES # BLD AUTO: 1.71 K/UL (ref 1–4.8)
LYMPHOCYTES NFR BLD: 11.4 % (ref 22–41)
MACROCYTES BLD QL SMEAR: ABNORMAL
MAGNESIUM SERPL-MCNC: 2.1 MG/DL (ref 1.5–2.5)
MANUAL DIFF BLD: NORMAL
MCH RBC QN AUTO: 29.1 PG (ref 27–33)
MCHC RBC AUTO-ENTMCNC: 34.6 G/DL (ref 33.7–35.3)
MCV RBC AUTO: 84.1 FL (ref 81.4–97.8)
MICRO URNS: ABNORMAL
MICROCYTES BLD QL SMEAR: ABNORMAL
MONOCYTES # BLD AUTO: 0.53 K/UL (ref 0–0.85)
MONOCYTES NFR BLD AUTO: 3.5 % (ref 0–13.4)
MORPHOLOGY BLD-IMP: NORMAL
NEUTROPHILS # BLD AUTO: 12.63 K/UL (ref 1.82–7.42)
NEUTROPHILS NFR BLD: 84.2 % (ref 44–72)
NITRITE UR QL STRIP.AUTO: NEGATIVE
NRBC # BLD AUTO: 0 K/UL
NRBC BLD-RTO: 0 /100 WBC
NT-PROBNP SERPL IA-MCNC: 84 PG/ML (ref 0–125)
PH UR STRIP.AUTO: 6 [PH] (ref 5–8)
PLATELET # BLD AUTO: 248 K/UL (ref 164–446)
PLATELET BLD QL SMEAR: NORMAL
PMV BLD AUTO: 11.2 FL (ref 9–12.9)
POIKILOCYTOSIS BLD QL SMEAR: NORMAL
POTASSIUM SERPL-SCNC: 4.1 MMOL/L (ref 3.6–5.5)
PROCALCITONIN SERPL-MCNC: 2.96 NG/ML
PROT SERPL-MCNC: 6.6 G/DL (ref 6–8.2)
PROT UR QL STRIP: 300 MG/DL
PROTHROMBIN TIME: 15.3 SEC (ref 12–14.6)
RBC # BLD AUTO: 4.02 M/UL (ref 4.7–6.1)
RBC # URNS HPF: ABNORMAL /HPF
RBC BLD AUTO: PRESENT
RBC CASTS #/AREA URNS LPF: ABNORMAL /LPF
RBC UR QL AUTO: ABNORMAL
RENAL EPI CELLS #/AREA URNS HPF: ABNORMAL /HPF
SCHISTOCYTES BLD QL SMEAR: NORMAL
SODIUM SERPL-SCNC: 132 MMOL/L (ref 135–145)
SP GR UR STRIP.AUTO: >=1.045
TARGETS BLD QL SMEAR: NORMAL
TRIGL SERPL-MCNC: 167 MG/DL (ref 0–149)
TROPONIN T SERPL-MCNC: 10 NG/L (ref 6–19)
UROBILINOGEN UR STRIP.AUTO-MCNC: 1 MG/DL
WBC # BLD AUTO: 15 K/UL (ref 4.8–10.8)
WBC #/AREA URNS HPF: ABNORMAL /HPF

## 2022-03-10 PROCEDURE — 85007 BL SMEAR W/DIFF WBC COUNT: CPT

## 2022-03-10 PROCEDURE — 306637 HCHG MISC ORTHO ITEM RC 0274

## 2022-03-10 PROCEDURE — 83605 ASSAY OF LACTIC ACID: CPT

## 2022-03-10 PROCEDURE — 80061 LIPID PANEL: CPT

## 2022-03-10 PROCEDURE — 76705 ECHO EXAM OF ABDOMEN: CPT

## 2022-03-10 PROCEDURE — 80053 COMPREHEN METABOLIC PANEL: CPT

## 2022-03-10 PROCEDURE — A9270 NON-COVERED ITEM OR SERVICE: HCPCS | Performed by: STUDENT IN AN ORGANIZED HEALTH CARE EDUCATION/TRAINING PROGRAM

## 2022-03-10 PROCEDURE — 87086 URINE CULTURE/COLONY COUNT: CPT

## 2022-03-10 PROCEDURE — 83615 LACTATE (LD) (LDH) ENZYME: CPT

## 2022-03-10 PROCEDURE — 96366 THER/PROPH/DIAG IV INF ADDON: CPT

## 2022-03-10 PROCEDURE — 700111 HCHG RX REV CODE 636 W/ 250 OVERRIDE (IP): Performed by: STUDENT IN AN ORGANIZED HEALTH CARE EDUCATION/TRAINING PROGRAM

## 2022-03-10 PROCEDURE — 700105 HCHG RX REV CODE 258: Performed by: STUDENT IN AN ORGANIZED HEALTH CARE EDUCATION/TRAINING PROGRAM

## 2022-03-10 PROCEDURE — 81001 URINALYSIS AUTO W/SCOPE: CPT

## 2022-03-10 PROCEDURE — 700102 HCHG RX REV CODE 250 W/ 637 OVERRIDE(OP): Performed by: STUDENT IN AN ORGANIZED HEALTH CARE EDUCATION/TRAINING PROGRAM

## 2022-03-10 PROCEDURE — 99233 SBSQ HOSP IP/OBS HIGH 50: CPT | Performed by: INTERNAL MEDICINE

## 2022-03-10 PROCEDURE — 82977 ASSAY OF GGT: CPT

## 2022-03-10 PROCEDURE — 83036 HEMOGLOBIN GLYCOSYLATED A1C: CPT

## 2022-03-10 PROCEDURE — 85027 COMPLETE CBC AUTOMATED: CPT

## 2022-03-10 PROCEDURE — 96372 THER/PROPH/DIAG INJ SC/IM: CPT

## 2022-03-10 RX ADMIN — SODIUM CHLORIDE 3 G: 900 INJECTION INTRAVENOUS at 06:09

## 2022-03-10 RX ADMIN — AZITHROMYCIN DIHYDRATE 500 MG: 250 TABLET, FILM COATED ORAL at 06:10

## 2022-03-10 RX ADMIN — SODIUM CHLORIDE 3 G: 900 INJECTION INTRAVENOUS at 12:25

## 2022-03-10 RX ADMIN — ENOXAPARIN SODIUM 40 MG: 40 INJECTION SUBCUTANEOUS at 06:10

## 2022-03-10 RX ADMIN — SODIUM CHLORIDE 3 G: 900 INJECTION INTRAVENOUS at 18:03

## 2022-03-10 RX ADMIN — SODIUM CHLORIDE 3 G: 900 INJECTION INTRAVENOUS at 02:12

## 2022-03-10 ASSESSMENT — COPD QUESTIONNAIRES
DURING THE PAST 4 WEEKS HOW MUCH DID YOU FEEL SHORT OF BREATH: NONE/LITTLE OF THE TIME
COPD SCREENING SCORE: 1
HAVE YOU SMOKED AT LEAST 100 CIGARETTES IN YOUR ENTIRE LIFE: NO/DON'T KNOW
DO YOU EVER COUGH UP ANY MUCUS OR PHLEGM?: NO/ONLY WITH OCCASIONAL COLDS OR INFECTIONS

## 2022-03-10 ASSESSMENT — ENCOUNTER SYMPTOMS
FEVER: 0
COUGH: 0
BLOOD IN STOOL: 0
EYE REDNESS: 0
PALPITATIONS: 0
HEMOPTYSIS: 0
EYE PAIN: 0
TREMORS: 0
DIARRHEA: 1
SHORTNESS OF BREATH: 1
NERVOUS/ANXIOUS: 0
FALLS: 0
VOMITING: 1
LOSS OF CONSCIOUSNESS: 0
SEIZURES: 0
INSOMNIA: 0
FOCAL WEAKNESS: 0
ABDOMINAL PAIN: 0
CONSTIPATION: 0
DIZZINESS: 1
MYALGIAS: 0
NAUSEA: 1
CHILLS: 1
HEADACHES: 0
WHEEZING: 0
WEAKNESS: 1

## 2022-03-10 NOTE — ED NOTES
Morning labs drawn.  Pt resting in bed, non-labored breathing.  No signs of distress at this time.

## 2022-03-10 NOTE — ASSESSMENT & PLAN NOTE
"Continue to monitor\"    STOP Tylenol  Ordered RUQ US, has mild bilirubinemia now  Viral hepatitis panel  Alcohol cessation; currently uses alcohol? Watch for withdrawal. He mentions he only drinks occasionally.  "

## 2022-03-10 NOTE — PROGRESS NOTES
[facility] Medicine Daily Progress Note    Date of Service  3/10/2022    Chief Complaint  Yao Acosta is a 51 y.o. male admitted 3/9/2022 with Shortness of Breath, Sent by MD (PCP sent him here for PE rule out. Pt tachycardic and hypoxic. 88% on RA, currently 93% on 4L NC), Tachycardia, Cough, and Headache        Hospital Course  No notes on file  He is obese. He has no significant past medical history. He only drinks alcohol occasionally. He presented with shortness of breath. He also had an episode of diarrhea, vomiting, chills, weakness/malaise for a few days now.  At the ED, he is afebrile, slight tachycardia,normotensive  HYPOXIC  CT Chest shows no PE but multifocal opacities  Mild leukocytosis.  Elevated liver enzymes.  CoVID x 1 NEGATIVE    Interval Problem Update  3/10. Malaised. Wife at bedside. Bibasilarrackles and occ wheezes on auscultation.    I have personally seen and examined the patient at bedside. I discussed the plan of care with patient, family and bedside RN.    Consultants/Specialty      Code Status  Full Code    Disposition  Patient is not medically cleared for discharge.   Anticipate discharge to to home with close outpatient follow-up.  I have placed the appropriate orders for post-discharge needs.    Review of Systems  Review of Systems   Constitutional: Positive for chills and malaise/fatigue. Negative for fever.   HENT: Negative for congestion, hearing loss and nosebleeds.    Eyes: Negative for pain and redness.   Respiratory: Positive for shortness of breath. Negative for cough, hemoptysis and wheezing.    Cardiovascular: Negative for chest pain and palpitations.   Gastrointestinal: Positive for diarrhea, nausea and vomiting. Negative for abdominal pain, blood in stool and constipation.   Genitourinary: Negative for dysuria, frequency and hematuria.   Musculoskeletal: Negative for falls, joint pain and myalgias.   Skin: Negative for rash.   Neurological: Positive for dizziness  and weakness. Negative for tremors, focal weakness, seizures, loss of consciousness and headaches.   Psychiatric/Behavioral: The patient is not nervous/anxious and does not have insomnia.    All other systems reviewed and are negative.       Physical Exam  Temp:  [36.9 °C (98.5 °F)] 36.9 °C (98.5 °F)  Pulse:  [] 81  Resp:  [16-28] 23  BP: ()/(59-75) 103/62  SpO2:  [88 %-94 %] 92 %    Physical Exam  Vitals and nursing note reviewed.   Constitutional:       Appearance: He is obese.   HENT:      Head: Normocephalic and atraumatic.      Right Ear: External ear normal.      Left Ear: External ear normal.      Nose: Nose normal.      Mouth/Throat:      Mouth: Mucous membranes are moist.   Eyes:      General: No scleral icterus.     Conjunctiva/sclera: Conjunctivae normal.   Cardiovascular:      Rate and Rhythm: Normal rate and regular rhythm.      Heart sounds: No murmur heard.    No friction rub. No gallop.   Pulmonary:      Effort: Pulmonary effort is normal.      Breath sounds: Wheezing (occ) and rales (bibasilar crackles) present.   Abdominal:      General: Abdomen is flat. Bowel sounds are normal. There is no distension.      Palpations: Abdomen is soft.      Tenderness: There is no abdominal tenderness. There is no guarding.   Musculoskeletal:         General: Normal range of motion.      Cervical back: Normal range of motion and neck supple.   Skin:     General: Skin is warm.   Neurological:      Mental Status: He is alert and oriented to person, place, and time. Mental status is at baseline.      Motor: Weakness present.   Psychiatric:         Mood and Affect: Mood normal.         Behavior: Behavior normal.         Thought Content: Thought content normal.         Judgment: Judgment normal.         Fluids  No intake or output data in the 24 hours ending 03/10/22 0751    Laboratory  Recent Labs     03/09/22  1735 03/10/22  0216   WBC 13.2* 15.0*   RBC 4.43* 4.02*   HEMOGLOBIN 13.0* 11.7*   HEMATOCRIT  "38.3* 33.8*   MCV 86.5 84.1   MCH 29.3 29.1   MCHC 33.9 34.6   RDW 49.1 47.0   PLATELETCT 266 248   MPV 10.6 11.2     Recent Labs     03/09/22  1735 03/10/22  0216   SODIUM 133* 132*   POTASSIUM 3.7 4.1   CHLORIDE 96 97   CO2 22 22   GLUCOSE 128* 113*   BUN 25* 26*   CREATININE 1.02 0.89   CALCIUM 9.5 8.6     Recent Labs     03/09/22  2302   INR 1.24*         Recent Labs     03/10/22  0216   TRIGLYCERIDE 167*   HDL 22*   LDL 72       Imaging  US-RUQ   Final Result      1.  Hepatomegaly.   2.  Multiple gallbladder polyps measuring up to 5 mm.   3.  Small right pleural effusion.      CT-CTA CHEST PULMONARY ARTERY W/ RECONS   Final Result         1. No pulmonary embolism.   2. Multifocal groundglass and consolidative opacities.   3. Heart appears mildly enlarged.            DX-CHEST-PORTABLE (1 VIEW)   Final Result      Infiltrate in the left lower lobe and lingula.      EC-ECHOCARDIOGRAM COMPLETE W/O CONT    (Results Pending)        Assessment/Plan  * Sepsis due to pneumonia, transaminitis (HCC)- (present on admission)  Assessment & Plan  This is Sepsis Present on admission  SIRS criteria identified on my evaluation include: Leukocytosis, with WBC greater than 12,000  Source is unk.  Sepsis protocol initiated  Fluid resuscitation ordered per protocol    IV antibiotics as appropriate for source of sepsis  Reassessment: I have reassessed the patient's hemodynamic status\"    Antibiotics, O2, breathing treatments.              Transaminitis- (present on admission)  Assessment & Plan  Continue to monitor\"    STOP Tylenol  Ordered RUQ US, has mild bilirubinemia now  Viral hepatitis panel  Alcohol cessation; currently uses alcohol? Watch for withdrawal. He mentions he only drinks occasionally.    Community acquired pneumonia of left lung, unspecified part of lung- (present on admission)  Assessment & Plan  IVF and antibiotics in place  pancultures ordered  RT  Oxygen per protocol\"    As above      CAP (community acquired " "pneumonia)  Assessment & Plan  IVF and antibiotics in place  pancultures ordered  RT  Oxygen per protocol\"    As above         VTE prophylaxis: enoxaparin ppx    I have performed a physical exam and reviewed and updated ROS and Plan today (3/10/2022). In review of yesterday's note (3/9/2022), there are no changes except as documented above.      "

## 2022-03-10 NOTE — ED NOTES
Patient provided pillow per request. Patient repositioned self in bed in position of comfort. No other needs at this time.

## 2022-03-10 NOTE — ED NOTES
Pt to R2. Pt is A&Ox4 and awake in bed. Pt placed on cardiac monitor, pulse ox, and automatic BP. VSS, saturating above 95% on 5L NC, ST in the 110s. Call light within reach and pt updated on POC.

## 2022-03-10 NOTE — ED PROVIDER NOTES
ED Provider Note    Scribed for Rob Bailon M.D. by Donn Cui. 3/9/2022  5:47 PM    Primary care provider: Unsure of name   Means of arrival: Walk in  History obtained from: Patient  History limited by: None    CHIEF COMPLAINT  Chief Complaint   Patient presents with    Shortness of Breath    Sent by MD     PCP sent him here for PE rule out. Pt tachycardic and hypoxic. 88% on RA, currently 93% on 4L NC    Tachycardia    Cough    Headache     HPI  Yao Acosta is a 51 y.o. male who presents to the Emergency Department for evaluation of hypoxia and tachycardia onset prior to arrival. He was seen prior to arrival, at which time he was noted to be tachycardic and hypoxic at 88% on room air, and they sent him to be evaluate for possible PE. He admits to associated symptoms of chills (6 days), vomiting (5 days), diaphoresis, headache, neck pain, moderate back pain, loss of appetite, light headedness, and cough (today), but denies runny nose, fever, chest pain, shortness of breath, loss of smell. No alleviating factors were reported.  He notes he drove to [place] 5 days ago. He has been vaccinated against COVID x2.     REVIEW OF SYSTEMS  Pertinent positives include hypoxia, tachycardia, chills, vomiting, diaphoresis, headache, neck pain, back pain, loss of appetite, light headedness, and cough.   Pertinent negatives include no  runny nose, fever, chest pain, shortness of breath, loss of smell.    All other systems reviewed and negative.    PAST MEDICAL HISTORY   None noted    SURGICAL HISTORY  patient denies any surgical history    SOCIAL HISTORY  Social History     Tobacco Use    Smoking status: Never Smoker    Smokeless tobacco: Never Used   Substance Use Topics    Alcohol use: Yes     Comment: occasionally    Drug use: Never      Social History     Substance and Sexual Activity   Drug Use Never       FAMILY HISTORY  History reviewed. No pertinent family history.    CURRENT MEDICATIONS  Home  "Medications       Reviewed by Belén Lorenzo R.N. (Registered Nurse) on 03/09/22 at 1702  Med List Status: <None>     Medication Last Dose Status        Patient Derek Taking any Medications                         ALLERGIES  No Known Allergies    PHYSICAL EXAM  VITAL SIGNS: /75   Pulse (!) 120   Temp 36.9 °C (98.5 °F) (Temporal)   Resp (!) 22   Ht 1.702 m (5' 7\")   Wt 90.7 kg (200 lb)   SpO2 93%   BMI 31.32 kg/m²     Constitutional: Well developed, Well nourished, Moderate distress, Non-toxic appearance.   HENT: Normocephalic, Atraumatic, Bilateral external ears normal, Oropharynx moist, No oral exudates.   Eyes: PERRLA, EOMI, Conjunctiva normal, No discharge.   Neck: No tenderness, Supple, No stridor.   Lymphatic: No lymphadenopathy noted.   Cardiovascular: Tachycardic heart rate, Normal rhythm.   Thorax & Lungs: Diffuse crackles throughout, tachypnea, No respiratory distress, No wheezing.   Abdomen: Soft, No tenderness, No masses, No pulsatile masses.   Skin: Warm, Dry, No erythema, No rash.   Extremities:, No edema No cyanosis.   Musculoskeletal: No tenderness to palpation or major deformities noted.  Intact distal pulses  Neurologic: Awake, alert. Moves all extremities spontaneously.  Psychiatric: Affect normal, Judgment normal, Mood normal.     LABS  Results for orders placed or performed during the hospital encounter of 03/09/22   Lactic acid (lactate)   Result Value Ref Range    Lactic Acid 1.6 0.5 - 2.0 mmol/L   CBC WITH DIFFERENTIAL   Result Value Ref Range    WBC 13.2 (H) 4.8 - 10.8 K/uL    RBC 4.43 (L) 4.70 - 6.10 M/uL    Hemoglobin 13.0 (L) 14.0 - 18.0 g/dL    Hematocrit 38.3 (L) 42.0 - 52.0 %    MCV 86.5 81.4 - 97.8 fL    MCH 29.3 27.0 - 33.0 pg    MCHC 33.9 33.7 - 35.3 g/dL    RDW 49.1 35.9 - 50.0 fL    Platelet Count 266 164 - 446 K/uL    MPV 10.6 9.0 - 12.9 fL    Neutrophils-Polys 89.70 (H) 44.00 - 72.00 %    Lymphocytes 5.20 (L) 22.00 - 41.00 %    Monocytes 3.40 0.00 - 13.40 %    " Eosinophils 0.00 0.00 - 6.90 %    Basophils 0.00 0.00 - 1.80 %    Nucleated RBC 0.00 /100 WBC    Neutrophils (Absolute) 11.84 (H) 1.82 - 7.42 K/uL    Lymphs (Absolute) 0.69 (L) 1.00 - 4.80 K/uL    Monos (Absolute) 0.45 0.00 - 0.85 K/uL    Eos (Absolute) 0.00 0.00 - 0.51 K/uL    Baso (Absolute) 0.00 0.00 - 0.12 K/uL    NRBC (Absolute) 0.00 K/uL    Anisocytosis 1+     Macrocytosis 1+     Microcytosis 1+    COMP METABOLIC PANEL   Result Value Ref Range    Sodium 133 (L) 135 - 145 mmol/L    Potassium 3.7 3.6 - 5.5 mmol/L    Chloride 96 96 - 112 mmol/L    Co2 22 20 - 33 mmol/L    Anion Gap 15.0 7.0 - 16.0    Glucose 128 (H) 65 - 99 mg/dL    Bun 25 (H) 8 - 22 mg/dL    Creatinine 1.02 0.50 - 1.40 mg/dL    Calcium 9.5 8.5 - 10.5 mg/dL    AST(SGOT) 341 (H) 12 - 45 U/L    ALT(SGPT) 231 (H) 2 - 50 U/L    Alkaline Phosphatase 136 (H) 30 - 99 U/L    Total Bilirubin 1.0 0.1 - 1.5 mg/dL    Albumin 3.4 3.2 - 4.9 g/dL    Total Protein 7.6 6.0 - 8.2 g/dL    Globulin 4.2 (H) 1.9 - 3.5 g/dL    A-G Ratio 0.8 g/dL   proBrain Natriuretic Peptide, NT   Result Value Ref Range    NT-proBNP 116 0 - 125 pg/mL   TROPONIN   Result Value Ref Range    Troponin T 11 6 - 19 ng/L   CoV-2, FLU A/B, and RSV by PCR (2-4 Hours CEPHEID) : Collect NP swab in [facility]    Specimen: Respirate   Result Value Ref Range    SARS-CoV-2 Source NP Swab    ESTIMATED GFR   Result Value Ref Range    GFR If African American >60 >60 mL/min/1.73 m 2    GFR If Non African American >60 >60 mL/min/1.73 m 2   PERIPHERAL SMEAR REVIEW   Result Value Ref Range    Peripheral Smear Review see below    PLATELET ESTIMATE   Result Value Ref Range    Plt Estimation Normal    MORPHOLOGY   Result Value Ref Range    RBC Morphology Present     Poikilocytosis 2+     Tear Drop Cells 1+     Echinocytes 2+    DIFFERENTIAL MANUAL   Result Value Ref Range    Myelocytes 1.70 %    Manual Diff Status PERFORMED    EKG   Result Value Ref Range    Report       [facility] Emergency  Dept.    Test Date:  2022  Pt Name:    GINNY KIRK     Department: ER  MRN:        [id number]                      Room:  Gender:     Male                         Technician: [id number]  :        1970                   Requested By:ER TRIAGE PROTOCOL  Order #:    [id number]                    Reading MD: NYASIA DAVIS MD    Measurements  Intervals                                Axis  Rate:       99                           P:          52  MO:         146                          QRS:        81  QRSD:       99                           T:          39  QT:         335  QTc:        431    Interpretive Statements  Sinus rhythm  Compared to ECG 2017 17:57:02  No significant changes  Electronically Signed On 3-9-2022 19:46:39 PST by NYASIA DAVIS MD        All labs reviewed by me.     12 Lead EKG interpreted by me as above.      RADIOLOGY  CT-CTA CHEST PULMONARY ARTERY W/ RECONS   Final Result         1. No pulmonary embolism.   2. Multifocal groundglass and consolidative opacities.   3. Heart appears mildly enlarged.            DX-CHEST-PORTABLE (1 VIEW)   Final Result      Infiltrate in the left lower lobe and lingula.        The radiologist's interpretation of all radiological studies have been reviewed by me.    COURSE & MEDICAL DECISION MAKING  Pertinent Labs & Imaging studies reviewed. (See chart for details)    5:47 PM - Patient seen and examined at bedside. Patient will be treated with Unasyn 3 g in  mL IVPB, azithromycin 500 mg injection, Toradol 30 mg injection. Ordered CT-CTA Chest Pulmonary Artery, DX-Chest, EKG, CoV-2, Flu A/b and RSV by PCR, Lactic Acid now and every 2 hours, Troponin, BNP, CBC with diff, CMP, UA, Urine Culture, Blood Culture x2 to evaluate his symptoms. The differential diagnoses include but are not limited to: COVID, Pneumonia, PE.     Decision Making:  Patient is very ill, tachycardic, respiratory failure, concern is for Covid.  Concern was also  for pulmonary embolism, sepsis.  Give the patient IV antibiotics, chest x-ray with a left lower lobe pneumonia.  CT scan shows multilobar pneumonia.  COVID-19 is pending.  Patient does have a leukocytosis with a left shift, will send off a procalcitonin.  Discussed case with Dr. Enamorado for hospitalization.    DISPOSITION:  Patient will be hospitalized by Dr. Enamorado in guarded condition.     FINAL IMPRESSION  1. Sepsis with acute hypoxic respiratory failure without septic shock, due to unspecified organism (HCC)    2. Pneumonia of both lungs due to infectious organism, unspecified part of lung          Donn DAS (Scribe), am scribing for, and in the presence of, Rob Bailon M.D..    Electronically signed by: Donn Cui (Ailynibmanish), 3/9/2022    IRob M.D. personally performed the services described in this documentation, as scribed by Donn Cui in my presence, and it is both accurate and complete.    The note accurately reflects work and decisions made by me.  Rob Bailon M.D.  3/9/2022  8:28 PM

## 2022-03-10 NOTE — ASSESSMENT & PLAN NOTE
"This is Sepsis Present on admission  SIRS criteria identified on my evaluation include: Leukocytosis, with WBC greater than 12,000  Source is unk.  Sepsis protocol initiated  Fluid resuscitation ordered per protocol    IV antibiotics as appropriate for source of sepsis  Reassessment: I have reassessed the patient's hemodynamic status\"    Antibiotics, O2, breathing treatments.            "

## 2022-03-10 NOTE — H&P
Hospital Medicine History & Physical Note    Date of Service  3/9/2022    Primary Care Physician  Pcp Pt States None    Consultants      Code Status  Full Code    Chief Complaint  Chief Complaint   Patient presents with   • Shortness of Breath   • Sent by MD     PCP sent him here for PE rule out. Pt tachycardic and hypoxic. 88% on RA, currently 93% on 4L NC   • Tachycardia   • Cough   • Headache       History of Presenting Illness  Yao Acosta is a 51 y.o. male who presented 3/9/2022 with for evaluation of hypoxia and tachycardia onset prior to arrival. He was seen prior to arrival, at which time he was noted to be tachycardic and hypoxic at 88% on room air, and they sent him to be evaluate for possible PE. He admits to associated symptoms of chills (6 days), vomiting (5 days), diaphoresis, headache, neck pain, moderate back pain, loss of appetite, light headedness, and cough (today), patient was vaccinated X2 for COVID.     Notable findings include: HR 95,RR 22, WBC 13.2, Na 133, glucose 128, BUN 25, Ast//231, alk phos 136    CT chest showing: No pulmonary embolism. 2. Multifocal groundglass and consolidative opacities. 3. Heart appears mildly enlarged.     Chest xray showing: Infiltrate in the left lower lobe and lingula.     EKG showing sinus tachycardia    I discussed the plan of care with patient.    Review of Systems  Review of Systems   Respiratory: Positive for shortness of breath.    Cardiovascular: Positive for palpitations.   All other systems reviewed and are negative.      Past Medical History   has no past medical history on file.    Surgical History   has no past surgical history on file.     Family History  family history is not on file.   Family history reviewed with patient. There is no family history that is pertinent to the chief complaint.     Social History   reports that he has never smoked. He has never used smokeless tobacco. He reports current alcohol use. He reports  that he does not use drugs.    Allergies  No Known Allergies    Medications  None       Physical Exam  Temp:  [36.9 °C (98.5 °F)] 36.9 °C (98.5 °F)  Pulse:  [] 92  Resp:  [16-22] 16  BP: (101-116)/(63-75) 106/65  SpO2:  [88 %-94 %] 91 %  Blood Pressure: 116/72   Temperature: 36.9 °C (98.5 °F)   Pulse: 95   Respiration: 20   Pulse Oximetry: 94 %       Physical Exam     Constitutional: acute respiratory distress  HENT: Normocephalic, no obvious evidence of acute trauma.  Eyes: No scleral icterus. Normal conjunctiva   Neck: Comfortable movement without any obvious restriction in the range of motion.  Cardiovascular:tachycardia  Thorax & Lungs: acute respiratory distress. No wheezing, rales or rhonchi heard on ausculation.  there is no obvious chest wall tenderness. I appreciate normal air movement throughout.   Abdomen: The abdomen is not visibly distended. Upon palpation, I find it to be without tenderness.  No mass appreciated.  Skin: The exposed portions of skin reveal no obvious rash or other abnormalities.  Extremities/Musculoskeletal: no lower extremity edema with no asymmetry.  Neurologic: Alert & oriented. No focal deficits observed.   Psychiatric: Normal affect appropriate for the clinical situation.    Laboratory:  Recent Labs     03/09/22  1735   WBC 13.2*   RBC 4.43*   HEMOGLOBIN 13.0*   HEMATOCRIT 38.3*   MCV 86.5   MCH 29.3   MCHC 33.9   RDW 49.1   PLATELETCT 266   MPV 10.6     Recent Labs     03/09/22  1735   SODIUM 133*   POTASSIUM 3.7   CHLORIDE 96   CO2 22   GLUCOSE 128*   BUN 25*   CREATININE 1.02   CALCIUM 9.5     Recent Labs     03/09/22  1735   ALTSGPT 231*   ASTSGOT 341*   ALKPHOSPHAT 136*   TBILIRUBIN 1.0   GLUCOSE 128*         Recent Labs     03/09/22  1735   NTPROBNP 116         Recent Labs     03/09/22  1735   TROPONINT 11       Imaging:  CT-CTA CHEST PULMONARY ARTERY W/ RECONS   Final Result         1. No pulmonary embolism.   2. Multifocal groundglass and consolidative opacities.   3.  Heart appears mildly enlarged.            DX-CHEST-PORTABLE (1 VIEW)   Final Result      Infiltrate in the left lower lobe and lingula.      EC-ECHOCARDIOGRAM COMPLETE W/O CONT    (Results Pending)         Assessment/Plan:  I anticipate this patient will require at least two midnights for appropriate medical management, necessitating inpatient admission.    Sepsis (HCC)- (present on admission)  Assessment & Plan  This is Sepsis Present on admission  SIRS criteria identified on my evaluation include: Leukocytosis, with WBC greater than 12,000  Source is unk.  Sepsis protocol initiated  Fluid resuscitation ordered per protocol    IV antibiotics as appropriate for source of sepsis  Reassessment: I have reassessed the patient's hemodynamic status          Transaminitis- (present on admission)  Assessment & Plan  Continue to monitor    CAP (community acquired pneumonia)- (present on admission)  Assessment & Plan  IVF and antibiotics in place  pancultures ordered  RT  Oxygen per protocol        VTE prophylaxis: SCDs/TEDs

## 2022-03-10 NOTE — ED TRIAGE NOTES
"Chief Complaint   Patient presents with   • Shortness of Breath   • Sent by MD     PCP sent him here for PE rule out. Pt tachycardic and hypoxic. 88% on RA, currently 93% on 4L NC   • Tachycardia   • Cough   • Headache     Symptoms started yesterday.     No history of blood clots. Denies any medical history.     /75   Pulse (!) 120   Temp 36.9 °C (98.5 °F) (Temporal)   Resp (!) 22   Ht 1.702 m (5' 7\")   Wt 90.7 kg (200 lb)   SpO2 93%   BMI 31.32 kg/m²       "

## 2022-03-11 ENCOUNTER — PATIENT OUTREACH (OUTPATIENT)
Dept: HEALTH INFORMATION MANAGEMENT | Facility: OTHER | Age: 52
End: 2022-03-11

## 2022-03-11 ENCOUNTER — APPOINTMENT (OUTPATIENT)
Dept: CARDIOLOGY | Facility: MEDICAL CENTER | Age: 52
DRG: 871 | End: 2022-03-11
Attending: INTERNAL MEDICINE

## 2022-03-11 VITALS
HEART RATE: 66 BPM | TEMPERATURE: 98.5 F | OXYGEN SATURATION: 97 % | DIASTOLIC BLOOD PRESSURE: 81 MMHG | WEIGHT: 200 LBS | BODY MASS INDEX: 31.39 KG/M2 | RESPIRATION RATE: 29 BRPM | SYSTOLIC BLOOD PRESSURE: 121 MMHG | HEIGHT: 67 IN

## 2022-03-11 LAB
ALBUMIN SERPL BCP-MCNC: 2.6 G/DL (ref 3.2–4.9)
ALBUMIN/GLOB SERPL: 0.7 G/DL
ALP SERPL-CCNC: 115 U/L (ref 30–99)
ALT SERPL-CCNC: 181 U/L (ref 2–50)
ANION GAP SERPL CALC-SCNC: 9 MMOL/L (ref 7–16)
AST SERPL-CCNC: 186 U/L (ref 12–45)
BILIRUB SERPL-MCNC: 1.2 MG/DL (ref 0.1–1.5)
BUN SERPL-MCNC: 20 MG/DL (ref 8–22)
CALCIUM SERPL-MCNC: 8.6 MG/DL (ref 8.5–10.5)
CHLORIDE SERPL-SCNC: 102 MMOL/L (ref 96–112)
CO2 SERPL-SCNC: 26 MMOL/L (ref 20–33)
CREAT SERPL-MCNC: 0.72 MG/DL (ref 0.5–1.4)
ERYTHROCYTE [DISTWIDTH] IN BLOOD BY AUTOMATED COUNT: 49.4 FL (ref 35.9–50)
GLOBULIN SER CALC-MCNC: 3.7 G/DL (ref 1.9–3.5)
GLUCOSE SERPL-MCNC: 112 MG/DL (ref 65–99)
HCT VFR BLD AUTO: 34.2 % (ref 42–52)
HGB BLD-MCNC: 11.6 G/DL (ref 14–18)
LV EJECT FRACT  99904: 60
LV EJECT FRACT MOD 2C 99903: 66.37
LV EJECT FRACT MOD 4C 99902: 66.56
LV EJECT FRACT MOD BP 99901: 65.87
MCH RBC QN AUTO: 29.3 PG (ref 27–33)
MCHC RBC AUTO-ENTMCNC: 33.9 G/DL (ref 33.7–35.3)
MCV RBC AUTO: 86.4 FL (ref 81.4–97.8)
PLATELET # BLD AUTO: 315 K/UL (ref 164–446)
PMV BLD AUTO: 10.4 FL (ref 9–12.9)
POTASSIUM SERPL-SCNC: 3.7 MMOL/L (ref 3.6–5.5)
PROT SERPL-MCNC: 6.3 G/DL (ref 6–8.2)
RBC # BLD AUTO: 3.96 M/UL (ref 4.7–6.1)
SODIUM SERPL-SCNC: 137 MMOL/L (ref 135–145)
WBC # BLD AUTO: 11.7 K/UL (ref 4.8–10.8)

## 2022-03-11 PROCEDURE — A9270 NON-COVERED ITEM OR SERVICE: HCPCS | Performed by: STUDENT IN AN ORGANIZED HEALTH CARE EDUCATION/TRAINING PROGRAM

## 2022-03-11 PROCEDURE — 99239 HOSP IP/OBS DSCHRG MGMT >30: CPT | Performed by: INTERNAL MEDICINE

## 2022-03-11 PROCEDURE — 85027 COMPLETE CBC AUTOMATED: CPT

## 2022-03-11 PROCEDURE — 700111 HCHG RX REV CODE 636 W/ 250 OVERRIDE (IP): Performed by: STUDENT IN AN ORGANIZED HEALTH CARE EDUCATION/TRAINING PROGRAM

## 2022-03-11 PROCEDURE — 93306 TTE W/DOPPLER COMPLETE: CPT | Mod: 26 | Performed by: INTERNAL MEDICINE

## 2022-03-11 PROCEDURE — 96366 THER/PROPH/DIAG IV INF ADDON: CPT

## 2022-03-11 PROCEDURE — 93306 TTE W/DOPPLER COMPLETE: CPT

## 2022-03-11 PROCEDURE — 700105 HCHG RX REV CODE 258: Performed by: STUDENT IN AN ORGANIZED HEALTH CARE EDUCATION/TRAINING PROGRAM

## 2022-03-11 PROCEDURE — 700102 HCHG RX REV CODE 250 W/ 637 OVERRIDE(OP): Performed by: STUDENT IN AN ORGANIZED HEALTH CARE EDUCATION/TRAINING PROGRAM

## 2022-03-11 PROCEDURE — 80053 COMPREHEN METABOLIC PANEL: CPT

## 2022-03-11 RX ORDER — ALBUTEROL SULFATE 90 UG/1
2 AEROSOL, METERED RESPIRATORY (INHALATION) EVERY 6 HOURS PRN
Qty: 8.5 G | Refills: 0 | Status: SHIPPED | OUTPATIENT
Start: 2022-03-11

## 2022-03-11 RX ORDER — AMOXICILLIN AND CLAVULANATE POTASSIUM 875; 125 MG/1; MG/1
1 TABLET, FILM COATED ORAL 2 TIMES DAILY
Qty: 6 TABLET | Refills: 0 | Status: SHIPPED | OUTPATIENT
Start: 2022-03-11 | End: 2022-03-14

## 2022-03-11 RX ORDER — AMOXICILLIN 250 MG
2 CAPSULE ORAL 2 TIMES DAILY
Qty: 30 TABLET | Refills: 0 | COMMUNITY
Start: 2022-03-11

## 2022-03-11 RX ORDER — POLYETHYLENE GLYCOL 3350 17 G/17G
POWDER, FOR SOLUTION ORAL
Refills: 3 | COMMUNITY
Start: 2022-03-11

## 2022-03-11 RX ORDER — NICOTINE 14MG/24HR
1 PATCH, TRANSDERMAL 24 HOURS TRANSDERMAL 2 TIMES DAILY WITH MEALS
Qty: 14 CAPSULE | Refills: 0 | Status: SHIPPED | OUTPATIENT
Start: 2022-03-11

## 2022-03-11 RX ADMIN — SODIUM CHLORIDE, POTASSIUM CHLORIDE, SODIUM LACTATE AND CALCIUM CHLORIDE: 600; 310; 30; 20 INJECTION, SOLUTION INTRAVENOUS at 01:08

## 2022-03-11 RX ADMIN — AZITHROMYCIN DIHYDRATE 500 MG: 250 TABLET, FILM COATED ORAL at 06:27

## 2022-03-11 RX ADMIN — SODIUM CHLORIDE 3 G: 900 INJECTION INTRAVENOUS at 01:08

## 2022-03-11 RX ADMIN — SODIUM CHLORIDE 3 G: 900 INJECTION INTRAVENOUS at 06:26

## 2022-03-11 RX ADMIN — ENOXAPARIN SODIUM 40 MG: 40 INJECTION SUBCUTANEOUS at 06:27

## 2022-03-11 RX ADMIN — SODIUM CHLORIDE 3 G: 900 INJECTION INTRAVENOUS at 12:50

## 2022-03-11 NOTE — ED NOTES
Pt notified regarding plan for sputum specimen collected for ordered culture test; verbalized understanding.

## 2022-03-11 NOTE — DISCHARGE PLANNING
Received Choice form at 1411  Agency/Facility Name: [facility]  Referral sent per Choice form @ [phone]  Agency/Facility Name: [facility]  Spoke To: Alexys  Outcome: DME order will be bedside any minute, referral was accepted.     RN JJ notified

## 2022-03-11 NOTE — ED NOTES
Rounded on patient. Patient resting in bed. No signs of distress noted. Equal chest rise and fall. No further needs at this time. Call light within reach. Will continue to monitor. Family at bedside.

## 2022-03-11 NOTE — ED NOTES
[address] Daily Progress Note      Admit Date:  9/30/2021    Subjective:  Ms. Vaughn Colder voices no specific complaints. Breathing ok. Denies chest pain. No abd pain, N/V. Objective:   BP 85/65   Pulse 91   Temp 97.9 °F (36.6 °C) (Oral)   Resp 16   Ht 5' 2\" (1.575 m)   Wt 265 lb 3.4 oz (120.3 kg)   SpO2 100%   BMI 48.51 kg/m²     Intake/Output Summary (Last 24 hours) at 10/1/2021 1308  Last data filed at 10/1/2021 0600  Gross per 24 hour   Intake 0 ml   Output --   Net 0 ml       TELEMETRY: Sinus     Physical Exam:  General:  Awake, alert, NAD  Skin:  Warm and dry  Neck:  JVP difficult  Chest:  Decreased BS, respiration normal  Cardiovascular:  RRR S1S2  Abdomen:  Soft nontender  Extremities:  no edema    Medications:    oxyCODONE-acetaminophen  1 tablet Oral Once    sodium chloride flush  5-40 mL IntraVENous 2 times per day    famotidine (PEPCID) injection  20 mg IntraVENous BID      sodium chloride      sodium chloride 100 mL/hr at 10/01/21 0909    sodium chloride      heparin (PORCINE) Infusion 8 Units/kg/hr (10/01/21 0018)     sodium chloride flush, sodium chloride, perflutren lipid microspheres, sodium chloride, sodium chloride flush, polyethylene glycol, acetaminophen **OR** acetaminophen    Lab Data:  CBC:   Recent Labs     09/30/21  2129 10/01/21  0357 10/01/21  1105   WBC 16.3* 17.5* 14.0*   HGB 13.8 12.1 11.3*   HCT 42.1 36.8 34.4*   MCV 96.0 95.6 96.0   * 123* 132*     BMP:   Recent Labs     09/30/21  1208 09/30/21  1733 10/01/21  0357    144 143   K 2.8* 3.5 4.0    102 105   CO2 21 24 22   BUN 34* 33* 37*   CREATININE 1.0 1.1 1.3*     LIVER PROFILE:   Recent Labs     09/30/21  1733   AST 42*   ALT 22   BILITOT 1.5*   ALKPHOS 67     PT/INR: No results for input(s): PROTIME, INR in the last 72 hours.   APTT:   Recent Labs     09/30/21  2224 10/01/21  0359 10/01/21  1105   APTT 29.4 49.5* 74.6*     BNP:  No results for input(s): BNP in the last 72 Pt sleeping. No apparent distress noted with non-labored breathing. Noted equal rise and fall of chest wall. VS stable and will continue to monitor pt.   hours.  IMAGING:     Assessment:  Patient Active Problem List    Diagnosis Date Noted    Chest pain of uncertain etiology 07/12/4456    Hypoxia 09/30/2021    Abnormal EKG 09/30/2021    Chest pain 09/30/2021    Obstructive sleep apnea (adult) (pediatric)     Hiatal hernia with GERD     Idiopathic esophageal varices without bleeding (HCC)     Gastric polyp     Essential hypertension 12/16/2016    Fatty liver disease, nonalcoholic 43/99/5208    Chronic pain of left knee 06/29/2015    Left ankle swelling 06/29/2015    Class 2 obesity with alveolar hypoventilation without serious comorbidity with body mass index (BMI) of 37.0 to 37.9 in adult ([facility] [address] 06/29/2015    Fatigue 06/29/2015       Plan:  1. Breathing ok. No chest pain. Now on nasal cannula. On AC with heparin. COVID pending. Monitor cr after contrast.  Watch Hgb on AC. Jehovahs Witness.        Core Measures:  · Discharge instructions:   · LVEF documented:   · ACEI for LV dysfunction:   · Smoking Cessation:    Dorcas Gaston MD, MD 10/1/2021 1:08 PM

## 2022-03-11 NOTE — DISCHARGE INSTRUCTIONS
Assign and follow up with primary provider or discharge clinic physician in 1 week.   He will need a repeat CXR or CT scan in 2 weeks or so to document resolution.   Check liver panel, if still elevated, refer to GI.   NO MORE ALCOHOL.   Instructions for obesity (BMI 31): Recommended weight loss advised, 5% through reduced calorie, low carb diet and 150 mins of exercise a week once better Recommend bariatric surgery evaluation if morbidly obese Educated on the increase of morbidity and mortality associated with excess weight including DM, Heart Disease, HTN, stroke, and sleep apnea. Recommended outpatient monitoring  of blood sugars, lipid panel, sleep study evaluation for metabolic syndrome.     Follow up to Pulmonology in 1-2 weeks if still hypoxic or continued abnormal imaging. PFTs, spirometry, sleep study can be done as with the follow up CT or CXR. Return to ER in the event of new or worsening symptoms.     Please note importance of compliance and the patient has agreed to proceed with all medical recommendations and follow up plan indicated above. All medications come with benefits and risks. Risks may include permanent injury or death and these risks can be minimized with close reassessment and monitoring. Please make it to your scheduled follow ups with your primary provider, and/or specialists clinic.      Discharge Instructions    Discharged to home by car with relative. Discharged via wheelchair, hospital escort: Yes.  Special equipment needed: Not Applicable and Oxygen    Be sure to schedule a follow-up appointment with your primary care doctor or any specialists as instructed.     Discharge Plan:   Diet Plan: Discussed  Activity Level: Discussed  Confirmed Follow up Appointment: Appointment Scheduled  Confirmed Symptoms Management: Discussed  Medication Reconciliation Updated: Yes  Influenza Vaccine Indication: Patient Refuses    I understand that a diet low in cholesterol, fat, and sodium is  recommended for good health. Unless I have been given specific instructions below for another diet, I accept this instruction as my diet prescription.   Other diet: Regular    Special Instructions: None    · Is patient discharged on Warfarin / Coumadin?   No     Depression / Suicide Risk    As you are discharged from this [facility] facility, it is important to learn how to keep safe from harming yourself.    Recognize the warning signs:  · Abrupt changes in personality, positive or negative- including increase in energy   · Giving away possessions  · Change in eating patterns- significant weight changes-  positive or negative  · Change in sleeping patterns- unable to sleep or sleeping all the time   · Unwillingness or inability to communicate  · Depression  · Unusual sadness, discouragement and loneliness  · Talk of wanting to die  · Neglect of personal appearance   · Rebelliousness- reckless behavior  · Withdrawal from people/activities they love  · Confusion- inability to concentrate     If you or a loved one observes any of these behaviors or has concerns about self-harm, here's what you can do:  · Talk about it- your feelings and reasons for harming yourself  · Remove any means that you might use to hurt yourself (examples: pills, rope, extension cords, firearm)  · Get professional help from the community (Mental Health, Substance Abuse, psychological counseling)  · Do not be alone:Call your Safe Contact- someone whom you trust who will be there for you.  · Call your local CRISIS HOTLINE 178-5875 or 560-326-1511  · Call your local Children's Mobile Crisis Response Team [place] (725) 002-0857 or www.[employer]  · Call the toll free National Suicide Prevention Hotlines   · National Suicide Prevention Lifeline 266-502-YZPX (9554)  · National Hope Line Network 800-SUICIDE (391-2679)      Community-Acquired Pneumonia, Adult  Pneumonia is an infection of the lungs. It causes swelling in the airways of  the lungs. Mucus and fluid may also build up inside the airways.  One type of pneumonia can happen while a person is in a hospital. A different type can happen when a person is not in a hospital (community-acquired pneumonia).   What are the causes?    This condition is caused by germs (viruses, bacteria, or fungi). Some types of germs can be passed from one person to another. This can happen when you breathe in droplets from the cough or sneeze of an infected person.  What increases the risk?  You are more likely to develop this condition if you:  · Have a long-term (chronic) disease, such as:  ? Chronic obstructive pulmonary disease (COPD).  ? Asthma.  ? Cystic fibrosis.  ? Congestive heart failure.  ? Diabetes.  ? Kidney disease.  · Have HIV.  · Have sickle cell disease.  · Have had your spleen removed.  · Do not take good care of your teeth and mouth (poor dental hygiene).  · Have a medical condition that increases the risk of breathing in droplets from your own mouth and nose.  · Have a weakened body defense system (immune system).  · Are a smoker.  · Travel to areas where the germs that cause this illness are common.  · Are around certain animals or the places they live.  What are the signs or symptoms?  · A dry cough.  · A wet (productive) cough.  · Fever.  · Sweating.  · Chest pain. This often happens when breathing deeply or coughing.  · Fast breathing or trouble breathing.  · Shortness of breath.  · Shaking chills.  · Feeling tired (fatigue).  · Muscle aches.  How is this treated?  Treatment for this condition depends on many things. Most adults can be treated at home. In some cases, treatment must happen in a hospital. Treatment may include:  · Medicines given by mouth or through an IV tube.  · Being given extra oxygen.  · Respiratory therapy.  In rare cases, treatment for very bad pneumonia may include:  · Using a machine to help you breathe.  · Having a procedure to remove fluid from around your  lungs.  Follow these instructions at home:  Medicines  · Take over-the-counter and prescription medicines only as told by your doctor.  ? Only take cough medicine if you are losing sleep.  · If you were prescribed an antibiotic medicine, take it as told by your doctor. Do not stop taking the antibiotic even if you start to feel better.  General instructions    · Sleep with your head and neck raised (elevated). You can do this by sleeping in a recliner or by putting a few pillows under your head.  · Rest as needed. Get at least 8 hours of sleep each night.  · Drink enough water to keep your pee (urine) pale yellow.  · Eat a healthy diet that includes plenty of vegetables, fruits, whole grains, low-fat dairy products, and lean protein.  · Do not use any products that contain nicotine or tobacco. These include cigarettes, e-cigarettes, and chewing tobacco. If you need help quitting, ask your doctor.  · Keep all follow-up visits as told by your doctor. This is important.  How is this prevented?  A shot (vaccine) can help prevent pneumonia. Shots are often suggested for:  · People older than 65 years of age.  · People older than 19 years of age who:  ? Are having cancer treatment.  ? Have long-term (chronic) lung disease.  ? Have problems with their body's defense system.  You may also prevent pneumonia if you take these actions:  · Get the flu (influenza) shot every year.  · Go to the dentist as often as told.  · Wash your hands often. If you cannot use soap and water, use hand .  Contact a doctor if:  · You have a fever.  · You lose sleep because your cough medicine does not help.  Get help right away if:  · You are short of breath and it gets worse.  · You have more chest pain.  · Your sickness gets worse. This is very serious if:  ? You are an older adult.  ? Your body's defense system is weak.  · You cough up blood.  Summary  · Pneumonia is an infection of the lungs.  · Most adults can be treated at home.  Some will need treatment in a hospital.  · Drink enough water to keep your pee pale yellow.  · Get at least 8 hours of sleep each night.  This information is not intended to replace advice given to you by your health care provider. Make sure you discuss any questions you have with your health care provider.  Document Released: 06/05/2009 Document Revised: 04/08/2020 Document Reviewed: 08/15/2019  Elsevier Patient Education © 2020 [employer]

## 2022-03-11 NOTE — ED NOTES
Pt sleeping. No apparent distress noted with equal rise and fall of chest wall. VS stable and will continue to monitor pt.

## 2022-03-11 NOTE — ED NOTES
Pt sleeping at this time. No apparent distress noted with equal rise and fall of chest wall. VS stable and will continue to monitor pt. Family at bedside.

## 2022-03-11 NOTE — ED NOTES
Pt sleeping but easily woken up with verbal stimuli. Pt is currently in no apparent distress. Blood specimen obtained and sent to lab as per order. Will continue to monitor pt.

## 2022-03-11 NOTE — ED NOTES
Unasyn IV completed. PIV patent and flushing well. No s/s of infiltration or phlebitis noted. Will continue to monitor pt.

## 2022-03-11 NOTE — PROGRESS NOTES
All discharge instructions completed with patient and wife. Stated understanding of instructions and need to establish with a PCP. Patient and wife stated understanding. Will escort off the unit when dressed.

## 2022-03-11 NOTE — DISCHARGE PLANNING
Anticipated Discharge Disposition:   Home O2    Action:   Received notification from [facility] that pt will need home O2.     CM called wife about choice and payment.   Wife, Mary said that they are willing to pay for home O2 .Explained to Mary that Home O2 may cost around $150 a month. She was agreeable to pay for this. Choice is for [facility].   Faxed choice to [facility].    CM notified Alexys at [facility].     Barriers to Discharge:   Pending O2 delivery    Plan:   Follow up with [facility] and [facility].

## 2022-03-11 NOTE — ED NOTES
Pt remains asleep in bed. No apparent distress noted with active rise and fall of chest wall. VS stable and will continue to monitor pt.

## 2022-03-11 NOTE — ED NOTES
Rounded on patient. Patient sleeping in bed. No signs of distress noted. Equal chest rise and fall. No further needs at this time. Call light within reach. VS stable and will continue to monitor pt. Family at bedside.

## 2022-03-11 NOTE — ED NOTES
Rounded on patient. Patient resting in bed. No signs of distress noted. Equal chest rise and fall. No further needs at this time. Call light within reach. Family at bedside. Will continue to monitor pt.

## 2022-03-11 NOTE — DISCHARGE SUMMARY
Discharge Summary    CHIEF COMPLAINT ON ADMISSION  Chief Complaint   Patient presents with   • Shortness of Breath   • Sent by MD     PCP sent him here for PE rule out. Pt tachycardic and hypoxic. 88% on RA, currently 93% on 4L NC   • Tachycardia   • Cough   • Headache       Reason for Admission  Sent by MD     Admission Date  3/9/2022    CODE STATUS  Full Code    HPI & HOSPITAL COURSE  This is a 51 y.o. male here with Shortness of Breath, Sent by MD (PCP sent him here for PE rule out. Pt tachycardic and hypoxic. 88% on RA, currently 93% on 4L NC), Tachycardia, Cough, and Headache  Please review Dr. Dain Robbins M.D. notes for further details of history of present illness, past medical/social/family histories, allergies and medications.  He is obese. He has no significant past medical history. He only drinks alcohol occasionally. He presented with shortness of breath. He also had an episode of diarrhea, vomiting, chills, weakness/malaise for a few days now.  At the ED, he is afebrile, slight tachycardia,normotensive  HYPOXIC  CT Chest shows no PE but multifocal opacities  Mild leukocytosis.  Elevated liver enzymes.  CoVID x 1 NEGATIVE  He was put on antiobiotics. 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  He required O2 on his oximetry study and will be discharged on O2. He will follow up also with Pulmonology. He will be given antibiotics.    His transaminitis improved and he can follow up with PCP. He is advised NO ALCOHOL.  At discharge date, Yao Houstoninoza Dave afebrile and hemodynamically stable.  Yao Acosta wanted to be discharged today.    Discharge Physical Exam  Vitals and nursing note reviewed.   Constitutional:       Appearance: He is obese.   HENT:      Head: Normocephalic and atraumatic.      Right Ear: External ear normal.      Left Ear: External ear normal.      Nose: Nose normal.      Mouth/Throat:      Mouth: Mucous  membranes are moist.   Eyes:      General: No scleral icterus.     Conjunctiva/sclera: Conjunctivae normal.   Cardiovascular:      Rate and Rhythm: Normal rate and regular rhythm.      Heart sounds: No murmur heard.    No friction rub. No gallop.   Pulmonary:      Effort: Pulmonary effort is normal.      Breath sounds: Wheezing (occ) and rales (bibasilar crackles) present.   Abdominal:      General: Abdomen is flat. Bowel sounds are normal. There is no distension.      Palpations: Abdomen is soft.      Tenderness: There is no abdominal tenderness. There is no guarding.   Musculoskeletal:         General: Normal range of motion.      Cervical back: Normal range of motion and neck supple.   Skin:     General: Skin is warm.   Neurological:      Mental Status: He is alert and oriented to person, place, and time. Mental status is at baseline.      Motor: Weakness present.   Psychiatric:         Mood and Affect: Mood normal.         Behavior: Behavior normal.         Thought Content: Thought content normal.         Judgment: Judgment normal.        Imaging  EC-ECHOCARDIOGRAM COMPLETE W/O CONT   Final Result      US-RUQ   Final Result      1.  Hepatomegaly.   2.  Multiple gallbladder polyps measuring up to 5 mm.   3.  Small right pleural effusion.      CT-CTA CHEST PULMONARY ARTERY W/ RECONS   Final Result         1. No pulmonary embolism.   2. Multifocal groundglass and consolidative opacities.   3. Heart appears mildly enlarged.            DX-CHEST-PORTABLE (1 VIEW)   Final Result      Infiltrate in the left lower lobe and lingula.                Therefore, he is discharged in fair and stable condition to home with close outpatient follow-up.    The patient met 2-midnight criteria for an inpatient stay at the time of discharge.    Discharge Date  3/11/2022    FOLLOW UP ITEMS POST DISCHARGE      DISCHARGE DIAGNOSES  Principal Problem:    Sepsis due to pneumonia, transaminitis (HCC) POA: Yes  Active Problems:    Community  acquired pneumonia of left lung, unspecified part of lung POA: Yes    Transaminitis POA: Yes  Resolved Problems:    * No resolved hospital problems. *      FOLLOW UP  No future appointments.  No follow-up provider specified.  Assign and follow up with primary provider or discharge clinic physician in 1 week. He will need a repeat CXR or CT scan in 2 weeks or so to document resolution. Check liver panel, if still elevated, refer to GI. NO MORE ALCOHOL. Instructions for obesity (BMI 31):  Recommended weight loss advised, 5% through reduced calorie, low carb diet and 150 mins of exercise a week once better  Recommend bariatric surgery evaluation if morbidly obese  Educated on the increase of morbidity and mortality associated with excess weight including DM, Heart Disease, HTN, stroke, and sleep apnea. Recommended outpatient monitoring  of blood sugars, lipid panel, sleep study evaluation for metabolic syndrome.  Follow up to Pulmonology in 1-2 weeks if still hypoxic or continued abnormal imaging. PFTs, spirometry, sleep study can be done as with the follow up CT or CXR.  Return to ER in the event of new or worsening symptoms. Please note importance of compliance and the patient has agreed to proceed with all medical recommendations and follow up plan indicated above. All medications come with benefits and risks. Risks may include permanent injury or death and these risks can be minimized with close reassessment and monitoring. Please make it to your scheduled follow ups with your primary provider, and/or specialists clinic.    MEDICATIONS ON DISCHARGE     Medication List      START taking these medications      Instructions   albuterol 108 (90 Base) MCG/ACT Aers inhalation aerosol   Inhale 2 Puffs every 6 hours as needed.  Dose: 2 Puff     guaiFENesin 100 MG/5ML syrup  Commonly known as: ROBITUSSIN   Take 10 mL by mouth 3 times a day as needed for Cough.  Dose: 10 mL     polyethylene glycol/lytes 17 g Pack  Commonly  known as: MIRALAX   Take  by mouth 1 time a day as needed (if sennosides and docusate ineffective after 24 hours).     Probiotic 250 MG Caps   Take 1 Capsule by mouth 2 times a day with meals.  Dose: 1 Capsule     senna-docusate 8.6-50 MG Tabs  Commonly known as: PERICOLACE or SENOKOT S   Take 2 Tablets by mouth 2 times a day.  Dose: 2 Tablet        STOP taking these medications    IBU-200 200 MG Tabs  Generic drug: ibuprofen        ASK your doctor about these medications      Instructions   amoxicillin-clavulanate 875-125 MG Tabs  Commonly known as: AUGMENTIN  Ask about: Should I take this medication?   Take 1 Tablet by mouth 2 times a day for 3 days.  Dose: 1 Tablet            Allergies  No Known Allergies    DIET  Orders Placed This Encounter   Procedures   • Diet Order Diet: Regular     Standing Status:   Standing     Number of Occurrences:   1     Order Specific Question:   Diet:     Answer:   Regular [1]       ACTIVITY  As tolerated  CONSULTATIONS      PROCEDURES  EC-ECHOCARDIOGRAM COMPLETE W/O CONT    Result Date: 3/11/2022  Transthoracic Echo Report Echocardiography Laboratory CONCLUSIONS No prior study is available for comparison. The left ventricular ejection fraction is visually estimated to be 60%. Normal regional wall motion. No significant valvular disease. GINNY LUBIN Exam Date:         2022                    11:51 Exam Location:     Inpatient Priority:          Routine Ordering Physician:        JOSE HOYT Referring Physician:       [id number] Carl Sonographer:               Torie Baeza RDCS Age:    51     Gender:    M MRN:    [id number] :    1970 BSA:    2.02   Ht (in):    67     Wt (lb):    200 Exam Type:     Complete Indications:     Shortness of breath ICD Codes:       R06.02 CPT Codes:       81164 BP:   116    /   80     HR:   70 Technical Quality:       Fair MEASUREMENTS  (Male / Female) Normal Values 2D ECHO LV Diastolic Diameter PLAX        5.4 cm                 4.2 - 5.9 / 3.9 - 5.3 cm LV Systolic Diameter PLAX         3.6 cm                2.1 - 4.0 cm IVS Diastolic Thickness           1 cm                  LVPW Diastolic Thickness          0.93 cm               LVOT Diameter                     2.1 cm                Estimated LV Ejection Fraction    60 %                  LV Ejection Fraction MOD BP       65.9 %                >= 55  % LV Ejection Fraction MOD 4C       66.6 %                LV Ejection Fraction MOD 2C       66.4 %                IVC Diameter                      1.1 cm                DOPPLER AV Peak Velocity                  1.1 m/s               AV Peak Gradient                  4.7 mmHg              AV Mean Gradient                  2.6 mmHg              LVOT Peak Velocity                0.94 m/s              AV Area Cont Eq vti               3.1 cm2               Mitral E Point Velocity           0.83 m/s              Mitral E to A Ratio               1.1                   MV Pressure Half Time             58 ms                 MV Area PHT                       3.8 cm2               MV Deceleration Time              200 ms                TV Peak E Velocity                0.62 m/s              TR Peak Velocity                  254 cm/s              * Indicates values subject to auto-interpretation LV EF:  60    % FINDINGS Left Ventricle Normal left ventricular chamber size. Normal left ventricular wall thickness. Normal left ventricular systolic function. The left ventricular ejection fraction is visually estimated to be 60%. Normal regional wall motion. Normal diastolic function. Right Ventricle The right ventricle is normal in size and systolic function. Right Atrium The right atrium is normal in size. Normal inferior vena cava size and inspiratory collapse. Left Atrium The left atrium is normal in size. Left atrial volume index is 20 mL/sq m. Mitral Valve Structurally normal mitral valve without significant stenosis or regurgitation. Aortic Valve  Structurally normal aortic valve without significant stenosis or regurgitation. Tricuspid Valve Structurally normal tricuspid valve. No tricuspid stenosis. Trace tricuspid regurgitation. Estimated right ventricular systolic pressure is 30 mmHg. Right atrial pressure is estimated to be 3 mmHg. Pulmonic Valve No pulmonic stenosis. Trace pulmonic insufficiency. Pericardium Normal pericardium without effusion. Aorta Normal aortic root for body surface area. The ascending aorta diameter is 3.3 cm. Royal Castro MD (Electronically Signed) Final Date:     11 March 2022                 13:35      LABORATORY  Lab Results   Component Value Date    SODIUM 137 03/11/2022    POTASSIUM 3.7 03/11/2022    CHLORIDE 102 03/11/2022    CO2 26 03/11/2022    GLUCOSE 112 (H) 03/11/2022    BUN 20 03/11/2022    CREATININE 0.72 03/11/2022        Lab Results   Component Value Date    WBC 11.7 (H) 03/11/2022    HEMOGLOBIN 11.6 (L) 03/11/2022    HEMATOCRIT 34.2 (L) 03/11/2022    PLATELETCT 315 03/11/2022        Total time of the discharge process exceeds 35 minutes.

## 2022-03-11 NOTE — ED NOTES
Pt transferred to hospital bed for comfort. Pt resting quietly in bed. No apparent distress noted at this time. Will continue to monitor pt. Family at bedside.

## 2022-03-11 NOTE — ED NOTES
Pt resting quietly in gurney. Pt stated feeling better at this time. Pt is in no apparent distress with non-labored breathing. VS stable and will continue to monitor pt. Family at bedside.

## 2022-03-11 NOTE — FACE TO FACE
Face to Face Note  -  Durable Medical Equipment    Dain Robbins M.D. - NPI: [id number]  I certify that this patient is under my care and that they had a durable medical equipment(DME)face to face encounter by myself that meets the physician DME face-to-face encounter requirements with this patient on:    Date of encounter:   Patient:                    MRN:                       YOB: 20[birth year]  Yao Acosta  [id number]  1970     The encounter with the patient was in whole, or in part, for the following medical condition, which is the primary reason for durable medical equipment:  Other - pneumonia, possible interstitial lung disease    I certify that, based on my findings, the following durable medical equipment is medically necessary:  Oxygen.    HOME O2 Saturation Measurements:(Values must be present for Home Oxygen orders)  Room air sat at rest: 88  Room air sat with amb: 86  With liters of O2: 2, O2 sat at rest with O2: 91  With Liters of O2: 91, O2 sat with amb with O2 : 3  Is the patient mobile?: Yes    My Clinical findings support the need for the above equipment due to:  Hypoxia    Supporting Symptoms: as above    If patient feels more short of breath, they can go up to 6 liters per minute and contact healthcare provider.

## 2022-03-12 LAB
BACTERIA UR CULT: NORMAL
SIGNIFICANT IND 70042: NORMAL
SITE SITE: NORMAL
SOURCE SOURCE: NORMAL

## 2022-03-14 LAB
BACTERIA BLD CULT: NORMAL
BACTERIA BLD CULT: NORMAL
SIGNIFICANT IND 70042: NORMAL
SIGNIFICANT IND 70042: NORMAL
SITE SITE: NORMAL
SITE SITE: NORMAL
SOURCE SOURCE: NORMAL
SOURCE SOURCE: NORMAL